# Patient Record
Sex: FEMALE | URBAN - METROPOLITAN AREA
[De-identification: names, ages, dates, MRNs, and addresses within clinical notes are randomized per-mention and may not be internally consistent; named-entity substitution may affect disease eponyms.]

---

## 2017-04-13 NOTE — PROGRESS NOTES
Followup Gyn Onc Note    RE: Jojo Fuentes  : 1955  EVIN: 2016  Managing Heme Oncologist:   Dr Licea at 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 16: XL//BSO/OMX/suboptimal tumor debulking by Dr Lopez (operative notes conflicting regarding extent of residual disease).   2.  Received 6 cycles of adjuvant dose dense chemotherapy in [facility] with Dr Licea (dose dense x 3 cycles, then q 3 week for cycles 4-6)  Per review of the notes, has had some issues with pancytopenia, but no major treatment delays    Ms Jojo Fuentes is a 61 year old year old [place] female who presents for followup egarding advanced ovarian cancer. She is here today with her daughter and the [facility] .   She has completed 6 cycles of adjuvant dose dense carbo/taxol.     Today, she feels very well. Still with some neuropathy of feet. Getting strength back. No bleeding.       Review of Systems:  Systemic:No weight changes.    Skin : No skin changes or new lesions.   Eye : No changes in vision.   Pulmonary: No cough or SOB.   Cardiovascular: No CP or palpitations  Gastrointestinal : No diarrhea, constipation, abdominal pain. Bowel habits normal. Nausea  Genitourinary: No dysuria, urgency or bleeding  Psychiatric: No depression or anxiety  Hematologic : No palpable lymph nodes.   Endocrine : No hot flashes. No heat/cold intolerance.      Neurological: No headaches, some numbness.       Past Medical History:  Past Medical History:   Diagnosis Date     Chronic pain      Ovarian cancer (H)      Sinusitis     h/o         Past Surgical History:  Past Surgical History:   Procedure Laterality Date     HYSTERECTOMY TOTAL ABDOMINAL, BILATERAL SALPINGO-OOPHORECTOMY, NODE DISSECTION, COMBINED Bilateral 2016    Procedure: COMBINED HYSTERECTOMY TOTAL ABDOMINAL, SALPINGO-OOPHORECTOMY, NODE DISSECTION;  Surgeon: Willie  Geno PERRY MD;  Location:  OR           [facility] history and Health Maintenance:    Last Mamogram: normal  Last Colonoscopy: 2016 normal     Current Medications:   Current Outpatient Prescriptions   Medication Sig Dispense Refill     oxyCODONE (ROXICODONE) 5 MG immediate release tablet Take 1-2 tablets (5-10 mg) by mouth every 4 hours as needed for moderate to severe pain 40 tablet 0     acetaminophen (TYLENOL) 325 MG tablet Take 2 tablets (650 mg) by mouth every 4 hours as needed for mild pain or fever 100 tablet 0     ibuprofen (ADVIL,MOTRIN) 600 MG tablet Take 1 tablet (600 mg) by mouth every 6 hours as needed for other (inflammatory pain) 100 tablet 0     enoxaparin (LOVENOX) 40 MG/0.4ML syringe Inject 0.4 mLs (40 mg) Subcutaneous every 24 hours 25 Syringe 0     polyethylene glycol (MIRALAX/GLYCOLAX) packet Take 17 g by mouth daily 30 packet 0     senna-docusate (SENOKOT-S;PERICOLACE) 8.6-50 MG per tablet Take 1-2 tablets by mouth 2 times daily 100 tablet 0     simethicone (MYLICON) 80 MG chewable tablet Take 1 tablet (80 mg) by mouth 4 times daily as needed for cramping (gas) 180 tablet 0     Omeprazole (PRILOSEC PO) Take 40 mg by mouth every morning       GABAPENTIN PO Take 100 mg by mouth daily Take 2 tabs at bedtime       VITAMIN D, CHOLECALCIFEROL, PO Take 1,000 Units by mouth daily Take 1 tab 2 x daily           Allergies:      Allergies   Allergen Reactions     Ascorbate Swelling     heartburn     No Clinical Screening - See Comments Itching     Pt reports she gets an allergic reaction to all pills - her eyes swell and she gets itchy without a rash        Social History:  Social History   Substance Use Topics     Smoking status: Never Smoker     Smokeless tobacco: Never Used     Alcohol use No   Work: homemaker  Ethnicity identification: French  Preferred language: French  Lives at home with: Daughters    Family History:   The patient's family history is notable for no known gyn, colon, breast  "malignanices although limited knowledge of disease in previous generations        Physical Exam:     /72  Pulse 68  Temp 98.2  F (36.8  C) (Oral)  Resp 18  Ht 1.651 m (5' 5\")  Wt 93 kg (205 lb)  SpO2 99%  BMI 34.11 kg/m2  Body mass index is 34.11 kg/(m^2).    General: Alert and oriented, no acute distress. Bright affect  Psych: Mood stable  Cardiovascular: RRR, no murmors  Pulmonary: Lungs clear . Normal respiratory effort  GI: Obesity. No distention. No masses. No hernia. Incision is well healed  Lymph: No enlarge lymph nodes in neck or groin  : Evidence of previous genital surgery with absent labia minora and minimal clitoris. Cervix multiparous and present. Cervix smooth except.  Nabothain cysts on cervix.   Chest:IV port c/d/i.     Ca125:   6/2016: 143  8/12/16: 25.3  9/16: 14  11/11/2016: 10  2/27/2017 (in [facility]): 12        CT C/A/P 11/3/2016: Impression: Thickened endometrium, presumably from the stated endometrial carcinoma. No convincng metastatic disease. Mildly complex splenic cyst.     Assessment:    Jojo Fuentes is a 61 year old woman with Advanced ovarian/fallopian adenocarcinoma, stage IIIC suboptimally debulked, completed adjuvant carbo/taxol. I have reviewed outside records including CT scan, labs, reports.       Plan:     1.)   Ovarian/fallopian tube cancer: JOAN based on Ca-125 and CT scan. As previously discussed, I do not think a \"second look\" surgery is indicated in her. I would recommend ongoing surveillance with Ca-125 and exams every 3 months. No CT scans.     -Followup q 3 months. Discussed symptoms of recurrence.   -Will need port flushes in [facility] q 6 weeks between our visits     2.) Genetic risk factors were assessed: Met with counselor today. Financial concerns which she is working out. Need to followup      3.) Labs and/or tests ordered include: Ca-125       Will send note to Dr Licea in [facility]    A total of 25 minutes of which were spent in counseling the " patient and/or treatment planning.    Ellen Pitts MD    Department of Ob/Gyn and Women's Health  Division of Gynecologic Oncology  [facility]  [phone]

## 2017-04-14 ENCOUNTER — OFFICE VISIT (OUTPATIENT)
Dept: ONCOLOGY | Facility: CLINIC | Age: 62
End: 2017-04-14
Attending: GENETIC COUNSELOR, MS
Payer: COMMERCIAL

## 2017-04-14 ENCOUNTER — ONCOLOGY VISIT (OUTPATIENT)
Dept: ONCOLOGY | Facility: CLINIC | Age: 62
End: 2017-04-14
Attending: OBSTETRICS & GYNECOLOGY
Payer: COMMERCIAL

## 2017-04-14 ENCOUNTER — APPOINTMENT (OUTPATIENT)
Dept: LAB | Facility: CLINIC | Age: 62
End: 2017-04-14
Attending: OBSTETRICS & GYNECOLOGY
Payer: COMMERCIAL

## 2017-04-14 VITALS
HEART RATE: 68 BPM | DIASTOLIC BLOOD PRESSURE: 72 MMHG | OXYGEN SATURATION: 99 % | TEMPERATURE: 98.2 F | SYSTOLIC BLOOD PRESSURE: 110 MMHG | HEIGHT: 65 IN | BODY MASS INDEX: 34.16 KG/M2 | WEIGHT: 205 LBS | RESPIRATION RATE: 18 BRPM

## 2017-04-14 DIAGNOSIS — C57.00 FALLOPIAN TUBE CANCER, CARCINOMA, UNSPECIFIED LATERALITY (H): Primary | ICD-10-CM

## 2017-04-14 DIAGNOSIS — N83.8 OVARIAN MASS: ICD-10-CM

## 2017-04-14 DIAGNOSIS — C57.00 FALLOPIAN TUBE CANCER, CARCINOMA, UNSPECIFIED LATERALITY (H): ICD-10-CM

## 2017-04-14 DIAGNOSIS — Z98.890 S/P LAPAROTOMY: ICD-10-CM

## 2017-04-14 LAB — CANCER AG125 SERPL-ACNC: 8 U/ML (ref 0–30)

## 2017-04-14 PROCEDURE — 96040 ZZH GENETIC COUNSELING, EACH 30 MINUTES: CPT | Mod: ZF | Performed by: GENETIC COUNSELOR, MS

## 2017-04-14 PROCEDURE — 99214 OFFICE O/P EST MOD 30 MIN: CPT | Mod: ZP | Performed by: OBSTETRICS & GYNECOLOGY

## 2017-04-14 RX ORDER — HEPARIN SODIUM (PORCINE) LOCK FLUSH IV SOLN 100 UNIT/ML 100 UNIT/ML
5 SOLUTION INTRAVENOUS DAILY PRN
Status: DISCONTINUED | OUTPATIENT
Start: 2017-04-14 | End: 2017-04-14 | Stop reason: HOSPADM

## 2017-04-14 RX ORDER — POLYETHYLENE GLYCOL 3350 17 G/17G
17 POWDER, FOR SOLUTION ORAL DAILY
Qty: 30 PACKET | Refills: 0 | Status: SHIPPED | OUTPATIENT
Start: 2017-04-14 | End: 2020-06-29

## 2017-04-14 RX ORDER — AMOXICILLIN 250 MG
1-2 CAPSULE ORAL 2 TIMES DAILY
Qty: 100 TABLET | Refills: 0 | Status: SHIPPED | OUTPATIENT
Start: 2017-04-14 | End: 2020-06-29

## 2017-04-14 RX ORDER — ACETAMINOPHEN 325 MG/1
650 TABLET ORAL EVERY 4 HOURS PRN
Qty: 100 TABLET | Refills: 0 | Status: SHIPPED | OUTPATIENT
Start: 2017-04-14

## 2017-04-14 RX ORDER — IBUPROFEN 600 MG/1
600 TABLET, FILM COATED ORAL EVERY 6 HOURS PRN
Qty: 100 TABLET | Refills: 0 | Status: SHIPPED | OUTPATIENT
Start: 2017-04-14

## 2017-04-14 RX ADMIN — SODIUM CHLORIDE, PRESERVATIVE FREE 5 ML: 5 INJECTION INTRAVENOUS at 12:37

## 2017-04-14 ASSESSMENT — PAIN SCALES - GENERAL: PAINLEVEL: NO PAIN (0)

## 2017-04-14 NOTE — LETTER
2017       RE: Jojo Fuentes  [address]    [place] [address]     Dear Colleague,    Thank you for referring your patient, Jojo Fuentes, to the [facility]. Please see a copy of my visit note below.    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 16: XL//BSO/OMX/suboptimal tumor debulking by Dr Lopez (operative notes conflicting regarding extent of residual disease).   2.  Received 6 cycles of adjuvant dose dense chemotherapy in [facility] with Dr Licea (dose dense x 3 cycles, then q 3 week for cycles 4-6)  Per review of the notes, has had some issues with pancytopenia, but no major treatment delays    Ms Jojo Fuentes is a 61 year old year old [place] female who presents for followup egarding advanced ovarian cancer. She is here today with her daughter and the [facility] .   She has completed 6 cycles of adjuvant dose dense carbo/taxol.     Today, she feels very well. Still with some neuropathy of feet. Getting strength back. No bleeding.       Review of Systems:  Systemic:No weight changes.    Skin : No skin changes or new lesions.   Eye : No changes in vision.   Pulmonary: No cough or SOB.   Cardiovascular: No CP or palpitations  Gastrointestinal : No diarrhea, constipation, abdominal pain. Bowel habits normal. Nausea  Genitourinary: No dysuria, urgency or bleeding  Psychiatric: No depression or anxiety  Hematologic : No palpable lymph nodes.   Endocrine : No hot flashes. No heat/cold intolerance.      Neurological: No headaches, some numbness.       Past Medical History:  Past Medical History:   Diagnosis Date     Chronic pain      Ovarian cancer (H)      Sinusitis     h/o         Past Surgical History:  Past Surgical  History:   Procedure Laterality Date     HYSTERECTOMY TOTAL ABDOMINAL, BILATERAL SALPINGO-OOPHORECTOMY, NODE DISSECTION, COMBINED Bilateral 2016    Procedure: COMBINED HYSTERECTOMY TOTAL ABDOMINAL, SALPINGO-OOPHORECTOMY, NODE DISSECTION;  Surgeon: Geno Tapia MD;  Location:  OR           [facility] history and Health Maintenance:    Last Mamogram: normal  Last Colonoscopy: 2016 normal     Current Medications:   Current Outpatient Prescriptions   Medication Sig Dispense Refill     oxyCODONE (ROXICODONE) 5 MG immediate release tablet Take 1-2 tablets (5-10 mg) by mouth every 4 hours as needed for moderate to severe pain 40 tablet 0     acetaminophen (TYLENOL) 325 MG tablet Take 2 tablets (650 mg) by mouth every 4 hours as needed for mild pain or fever 100 tablet 0     ibuprofen (ADVIL,MOTRIN) 600 MG tablet Take 1 tablet (600 mg) by mouth every 6 hours as needed for other (inflammatory pain) 100 tablet 0     enoxaparin (LOVENOX) 40 MG/0.4ML syringe Inject 0.4 mLs (40 mg) Subcutaneous every 24 hours 25 Syringe 0     polyethylene glycol (MIRALAX/GLYCOLAX) packet Take 17 g by mouth daily 30 packet 0     senna-docusate (SENOKOT-S;PERICOLACE) 8.6-50 MG per tablet Take 1-2 tablets by mouth 2 times daily 100 tablet 0     simethicone (MYLICON) 80 MG chewable tablet Take 1 tablet (80 mg) by mouth 4 times daily as needed for cramping (gas) 180 tablet 0     Omeprazole (PRILOSEC PO) Take 40 mg by mouth every morning       GABAPENTIN PO Take 100 mg by mouth daily Take 2 tabs at bedtime       VITAMIN D, CHOLECALCIFEROL, PO Take 1,000 Units by mouth daily Take 1 tab 2 x daily           Allergies:      Allergies   Allergen Reactions     Ascorbate Swelling     heartburn     No Clinical Screening - See Comments Itching     Pt reports she gets an allergic reaction to all pills - her eyes swell and she gets itchy without a rash        Social History:  Social History   Substance Use Topics     Smoking status: Never Smoker      "Smokeless tobacco: Never Used     Alcohol use No   Work: homemaker  Ethnicity identification: Citizen of [place]  Preferred language: Citizen of [place]  Lives at home with: Daughters    Family History:   The patient's family history is notable for no known gyn, colon, breast malignanices although limited knowledge of disease in previous generations        Physical Exam:     /72  Pulse 68  Temp 98.2  F (36.8  C) (Oral)  Resp 18  Ht 1.651 m (5' 5\")  Wt 93 kg (205 lb)  SpO2 99%  BMI 34.11 kg/m2  Body mass index is 34.11 kg/(m^2).    General: Alert and oriented, no acute distress. Bright affect  Psych: Mood stable  Cardiovascular: RRR, no murmors  Pulmonary: Lungs clear . Normal respiratory effort  GI: Obesity. No distention. No masses. No hernia. Incision is well healed  Lymph: No enlarge lymph nodes in neck or groin  : Evidence of previous genital surgery with absent labia minora and minimal clitoris. Cervix multiparous and present. Cervix smooth except.  Nabothain cysts on cervix.   Chest:IV port c/d/i.     Ca125:   6/2016: 143  8/12/16: 25.3  9/16: 14  11/11/2016: 10  2/27/2017 (in [facility]): 12        CT C/A/P 11/3/2016: Impression: Thickened endometrium, presumably from the stated endometrial carcinoma. No convincng metastatic disease. Mildly complex splenic cyst.     Assessment:    Jojo Fuentes is a 61 year old woman with Advanced ovarian/fallopian adenocarcinoma, stage IIIC suboptimally debulked, completed adjuvant carbo/taxol. I have reviewed outside records including CT scan, labs, reports.       Plan:     1.)   Ovarian/fallopian tube cancer: JOAN based on Ca-125 and CT scan. As previously discussed, I do not think a \"second look\" surgery is indicated in her. I would recommend ongoing surveillance with Ca-125 and exams every 3 months. No CT scans.     -Followup q 3 months. Discussed symptoms of recurrence.   -Will need port flushes in [facility] q 6 weeks between our visits     2.) Genetic risk factors were assessed: " Met with counselor today. Financial concerns which she is working out. Need to followup      3.) Labs and/or tests ordered include: Ca-125       Will send note to Dr Licea in [facility]    A total of 25 minutes of which were spent in counseling the patient and/or treatment planning.    Ellen Pitts MD    Department of Ob/Gyn and Women's Health  Division of Gynecologic Oncology  [facility]  [phone]

## 2017-04-14 NOTE — LETTER
4/14/2017       RE: Jojo Fuentes  [address]    [place] [address]     Dear Colleague,    Thank you for referring your patient, Jojo Fuentes, to the [facility]. Please see a copy of my visit note below.    4/14/2017     Referring Provider: Ellen Pitts MD     Presenting Information:   I met with Jojo Fuentes and her daughter today for genetic counseling at the Cancer [facility] Management Program at the [facility] to discuss her diagnosis of fallopian tube cancer. She is here again today to review genetic testing. We met previously on 11/11/2016, but due to time constraints, we were unable to complete our visit.  A [employer]  was used for the visit today.     Personal History:  Jojo is a 62 year old female.  She was diagnosed with fallopian tube cancer at 61, and was treated with a KATALINA/BSO and chemotherapy. She denies personal history of other cancers. She had her first menstrual period at age 17, her first child at age 27, and is post-menopausal.    She reports no history of oral contraceptive use.  She reports having had two mammograms in the past that were normal. She also reports a normal colonoscopy at the time of her fallopian tube cancer diagnosis.      Family History: obtained at initial visit on 11/11/2016, and scanned into Media tab.  In short, Jojo has no known family history of cancer.      Discussion:    Jojo's personal history of fallopian tube cancer is suggestive of a hereditary cause. Family history information, however, is limited.  We discussed that we do not know for certain if relatives in her family had cancer that she is not aware of, though she feels that no relatives have had cancer.    We reviewed the natural history and genetics of fallopian tube cancer. We discussed that some cancers can be due to a mistake, or a mutation, in a gene. We discussed that if a mutation is found in a person, they will be at a much higher  risk for certain cancers than the general population. These mistakes can be passed from generation to generation in a family.    We discussed that mutations in either BRCA1 or BRCA2 cause increased risk for ovarian and breast cancer. We also discussed Emmanuel syndrome.  We discussed that if someone has Emmanuel syndrome, they are at in increased risk for colon cancer, endometrial/uterine cancer, and ovarian cancer. There can be other cancers seen with Emmanuel syndrome, as well. We also discussed that there are other genes that can increase a woman's risk for fallopian tube or ovarian cancer.     Genetic testing can help determine whether or not a cancer susceptibility mutation is present in a family, and as a result may provide information that could help clarify an individual's (and possibly other family members') cancer risks.  It may also help direct decisions about cancer screening and prevention options.  We reviewed that when someone has a mutation in the BRCA1 or BRCA2 genes, additional screening can be done to screen for breast cancer. Some women elect a preventative surgery to remove the breasts to reduce cancer risk.  We also discussed that with Emmanuel syndrome, more frequent colonoscopies and other types of cancer screens can be recommended.     Based on her personal history, Jojo meets current National Comprehensive Cancer Network (NCCN) criteria for genetic testing of BRCA1 and BRCA2.      We reviewed genetic testing options for hereditary gynecologic cancers: actionable high/moderate risk panel testing (GYNplus, 13 genes), and expanded high and moderate risk panel testing (OvaNext, 24 genes). We discussed that the actionable genes could guide Jojo's screening for other types of cancer.   Some of the genes in the GYNplus panel are associated with specific hereditary cancer syndromes and have published management guidelines: Hereditary Breast and Ovarian Cancer syndrome (BRCA1, BRCA2), Emmanuel syndrome (EPCAM,  MLH1, MSH2, MSH6, PMS2), Cowden Syndrome (PTEN), and Li Fraumeni syndrome (TP53).   Risk-reducing salpingo-oophorectomy can be considered in women with mutations in BRIP1, RAD51C, or RAD51D.  Breast screening recommendations are available for the PALB2 gene.    Risks, benefits, and limitations of genetic testing were discussed. Implications of possible test results, including positive, negative, and variant of uncertain significance, were discussed today.  At the end of our visit, Jojo was most interested in the genes that could guide screening for other cancers, and elected the GynPlus panel. We reviewed the consent form, and consent was obtained for testing. She wished to proceed with prior-authorization for testing first, as she would like to know the cost of testing.    Medical Management: The information from genetic testing may determine additional cancer screening recommendations (i.e. mammogram and breast MRI, more frequent colonoscopies, annual dermatologic exams, etc.) as well as options for risk reducing surgeries (i.e. bilateral mastectomy) for Jojo and possibly her relatives.  These recommendations will be discussed in detail once genetic testing is completed.     Plan:  1) Today Jojo elected to proceed with pre-authorization for GynPlus.   2) This information should be available in a few weeks.  3) Should Jojo choose to proceed with testing after pre-authorization is obtained, we will try to coordinate our future visit to coincide with when she sees Dr. Pitts next.      Face to face time: 40 mins     Lisa Fournier MS, [facility]  Certified Genetic Counselor  P. [phone]  F. [phone]

## 2017-04-14 NOTE — LETTER
Cancer Risk Management  Program Locations    [facility]  [facility]  Mailing Address  Cancer Risk Management Program  [facility]  [address]  [address]  [place] [address]    New patient appointments  [phone]  April 18, 2017    Jojo Fuentes  [address]    [place] [address]      Dear Jojo,    It was a pleasure meeting with you at the [facility] on 4/14/2017.  Here is a copy of the progress note from your recent genetic counseling visit to the Cancer Risk Management Program.  If you have any additional questions, please feel free to call.    Referring Provider: Ellen Pitts MD     Presenting Information:   I met with Jojo Fuentes and her daughter today for genetic counseling at the Cancer Risk Management Program at the [facility] to discuss her diagnosis of fallopian tube cancer. She is here again today to review genetic testing. We met previously on 11/11/2016, but due to time constraints, we were unable to complete our visit.  A [employer]  was used for the visit today.     Personal History:  Jojo is a 62 year old female.  She was diagnosed with fallopian tube cancer at 61, and was treated with a KATALINA/BSO and chemotherapy. She denies personal history of other cancers. She had her first menstrual period at age 17, her first child at age 27, and is post-menopausal.    She reports no history of oral contraceptive use.  She reports having had two mammograms in the past that were normal. She also reports a normal colonoscopy at the time of her fallopian tube cancer diagnosis.      Family History: obtained at initial visit on 11/11/2016, and scanned into Media tab.  In short, Jojo has no known family history of cancer.      Discussion:    Jojo's personal history  of fallopian tube cancer is suggestive of a hereditary cause. Family history information, however, is limited.  We discussed that we do not know for certain if relatives in her family had cancer that she is not aware of, though she feels that no relatives have had cancer.    We reviewed the natural history and genetics of fallopian tube cancer. We discussed that some cancers can be due to a mistake, or a mutation, in a gene. We discussed that if a mutation is found in a person, they will be at a much higher risk for certain cancers than the general population. These mistakes can be passed from generation to generation in a family.    We discussed that mutations in either BRCA1 or BRCA2 cause increased risk for ovarian and breast cancer. We also discussed Emmanuel syndrome.  We discussed that if someone has Emmanuel syndrome, they are at in increased risk for colon cancer, endometrial/uterine cancer, and ovarian cancer. There can be other cancers seen with Emmanuel syndrome, as well. We also discussed that there are other genes that can increase a woman's risk for fallopian tube or ovarian cancer.     Genetic testing can help determine whether or not a cancer susceptibility mutation is present in a family, and as a result may provide information that could help clarify an individual's (and possibly other family members') cancer risks.  It may also help direct decisions about cancer screening and prevention options.  We reviewed that when someone has a mutation in the BRCA1 or BRCA2 genes, additional screening can be done to screen for breast cancer. Some women elect a preventative surgery to remove the breasts to reduce cancer risk.  We also discussed that with Emmanuel syndrome, more frequent colonoscopies and other types of cancer screens can be recommended.     Based on her personal history, Jojo meets current National Comprehensive Cancer Network (NCCN) criteria for genetic testing of BRCA1 and BRCA2.      We reviewed  genetic testing options for hereditary gynecologic cancers: actionable high/moderate risk panel testing (GYNplus, 13 genes), and expanded high and moderate risk panel testing (OvaNext, 24 genes). We discussed that the actionable genes could guide Jojo's screening for other types of cancer.   Some of the genes in the GYNplus panel are associated with specific hereditary cancer syndromes and have published management guidelines: Hereditary Breast and Ovarian Cancer syndrome (BRCA1, BRCA2), Emmanuel syndrome (EPCAM, MLH1, MSH2, MSH6, PMS2), Cowden Syndrome (PTEN), and Li Fraumeni syndrome (TP53).   Risk-reducing salpingo-oophorectomy can be considered in women with mutations in BRIP1, RAD51C, or RAD51D.  Breast screening recommendations are available for the PALB2 gene.    Risks, benefits, and limitations of genetic testing were discussed. Implications of possible test results, including positive, negative, and variant of uncertain significance, were discussed today.  At the end of our visit, Jojo was most interested in the genes that could guide screening for other cancers, and elected the GynPlus panel. We reviewed the consent form, and consent was obtained for testing. She wished to proceed with prior-authorization for testing first, as she would like to know the cost of testing.    Medical Management: The information from genetic testing may determine additional cancer screening recommendations (i.e. mammogram and breast MRI, more frequent colonoscopies, annual dermatologic exams, etc.) as well as options for risk reducing surgeries (i.e. bilateral mastectomy) for Jojo and possibly her relatives.  These recommendations will be discussed in detail once genetic testing is completed.     Plan:  1) Today Jojo elected to proceed with pre-authorization for GynPlus.   2) This information should be available in a few weeks.  3) Should Jojo choose to proceed with testing after pre-authorization is obtained, we will  try to coordinate our future visit to coincide with when she sees Dr. Pitts next.    Face to face time: 40 mins     Lisa Fournier MS, [facility]  Certified Genetic Counselor  P. [phone], F. [phone]

## 2017-04-14 NOTE — NURSING NOTE
"Jojo Fuentes is a 62 year old female who presents for:  Chief Complaint   Patient presents with     Port Draw     port accessed by RN, lab collected and sent and pt de-accessed immediately. Vitals taken and pt checked in for next appt.     Oncology Clinic Visit     Ovarian Mass F/U        Initial Vitals:  /72  Pulse 68  Temp 98.2  F (36.8  C) (Oral)  Resp 18  Ht 1.651 m (5' 5\")  Wt 93 kg (205 lb)  SpO2 99%  BMI 34.11 kg/m2 Estimated body mass index is 34.11 kg/(m^2) as calculated from the following:    Height as of this encounter: 1.651 m (5' 5\").    Weight as of this encounter: 93 kg (205 lb).. Body surface area is 2.07 meters squared. BP completed using cuff size: NA (Not Taken)  No Pain (0) No LMP recorded. Patient is postmenopausal. Allergies and medications reviewed.     Medications: MEDICATION REFILLS NEEDED TODAY.  Pharmacy name entered into EPIC: Data Unavailable    Comments: Vitals completed in lab.    7 minutes for nursing intake (face to face time)   Gabbie Reyez LPN        "

## 2017-04-14 NOTE — PROGRESS NOTES
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 She is here again today to review genetic testing. We met previously on 11/11/2016, but due to time constraints, we were unable to complete our visit.  A [employer]  was used for the visit today.     Personal History:  Jojo is a 62 year old female.  She was diagnosed with fallopian tube cancer at 61, and was treated with a KATALINA/BSO and chemotherapy. She denies personal history of other cancers. She had her first menstrual period at age 17, her first child at age 27, and is post-menopausal.    She reports no history of oral contraceptive use.  She reports having had two mammograms in the past that were normal. She also reports a normal colonoscopy at the time of her fallopian tube cancer diagnosis.      Family History: obtained at initial visit on 11/11/2016, and scanned into Media tab.  In short, Jojo has no known family history of cancer.      Discussion:    Jojo's personal history of fallopian tube cancer is suggestive of a hereditary cause. Family history information, however, is limited.  We discussed that we do not know for certain if relatives in her family had cancer that she is not aware of, though she feels that no relatives have had cancer.    We reviewed the natural history and genetics of fallopian tube cancer. We discussed that some cancers can be due to a mistake, or a mutation, in a gene. We discussed that if a mutation is found in a person, they will be at a much higher risk for certain cancers than the general population. These mistakes can be passed from generation to generation in a family.    We discussed that mutations in either BRCA1 or BRCA2 cause increased risk for ovarian and breast cancer. We also discussed  Emmanuel syndrome.  We discussed that if someone has Emmanuel syndrome, they are at in increased risk for colon cancer, endometrial/uterine cancer, and ovarian cancer. There can be other cancers seen with Emmanuel syndrome, as well. We also discussed that there are other genes that can increase a woman's risk for fallopian tube or ovarian cancer.     Genetic testing can help determine whether or not a cancer susceptibility mutation is present in a family, and as a result may provide information that could help clarify an individual's (and possibly other family members') cancer risks.  It may also help direct decisions about cancer screening and prevention options.  We reviewed that when someone has a mutation in the BRCA1 or BRCA2 genes, additional screening can be done to screen for breast cancer. Some women elect a preventative surgery to remove the breasts to reduce cancer risk.  We also discussed that with Emmanuel syndrome, more frequent colonoscopies and other types of cancer screens can be recommended.     Based on her personal history, Jojo meets current National Comprehensive Cancer Network (NCCN) criteria for genetic testing of BRCA1 and BRCA2.      We reviewed genetic testing options for hereditary gynecologic cancers: actionable high/moderate risk panel testing (GYNplus, 13 genes), and expanded high and moderate risk panel testing (OvaNext, 24 genes). We discussed that the actionable genes could guide Jojo's screening for other types of cancer.   Some of the genes in the GYNplus panel are associated with specific hereditary cancer syndromes and have published management guidelines: Hereditary Breast and Ovarian Cancer syndrome (BRCA1, BRCA2), Emmanuel syndrome (EPCAM, MLH1, MSH2, MSH6, PMS2), Cowden Syndrome (PTEN), and Li Fraumeni syndrome (TP53).   Risk-reducing salpingo-oophorectomy can be considered in women with mutations in BRIP1, RAD51C, or RAD51D.  Breast screening recommendations are available for the  PALB2 gene.    Risks, benefits, and limitations of genetic testing were discussed. Implications of possible test results, including positive, negative, and variant of uncertain significance, were discussed today.  At the end of our visit, Jojo was most interested in the genes that could guide screening for other cancers, and elected the GynPlus panel. We reviewed the consent form, and consent was obtained for testing. She wished to proceed with prior-authorization for testing first, as she would like to know the cost of testing.    Medical Management: The information from genetic testing may determine additional cancer screening recommendations (i.e. mammogram and breast MRI, more frequent colonoscopies, annual dermatologic exams, etc.) as well as options for risk reducing surgeries (i.e. bilateral mastectomy) for Jojo and possibly her relatives.  These recommendations will be discussed in detail once genetic testing is completed.     Plan:  1) Today Jojo elected to proceed with pre-authorization for GynPlus.   2) This information should be available in a few weeks.  3) Should Jojo choose to proceed with testing after pre-authorization is obtained, we will try to coordinate our future visit to coincide with when she sees Dr. Pitts next.      Face to face time: 40 mins     Lisa Fournier MS, [facility]  Certified Genetic Counselor  P. [phone]  F. [phone]

## 2017-04-14 NOTE — MR AVS SNAPSHOT
After Visit Summary   4/14/2017    Jojo Fuentes    MRN: [id number]           Patient Information     Date Of Birth          1955        Visit Information        Provider Department      4/14/2017 11:00 AM Lisa Fournier GC; [employer];  2 [address] CONSULT [facility]        Today's Diagnoses     Fallopian tube cancer, carcinoma, unspecified laterality (H)    -  1       Follow-ups after your visit        Your next 10 appointments already scheduled     Jul 14, 2017 12:00 PM CDT   [facility]   [facility] Lab Draw ([facility])    [address] [place] [address] [id number] [phone]            Jul 14, 2017 12:30 PM CDT   (Arrive by 12:15 PM)   Return Visit with Ellen Pitts MD   [facility] ([facility])    [address] [place] [address] [id number] [phone]              Who to contact     If you have questions or need follow up information about today's clinic visit or your schedule please contact [facility] directly at [phone].  Normal or non-critical lab and imaging results will be communicated to you by MyChart, letter or phone within 4 business days after the clinic has received the results. If you do not hear from us within 7 days, please contact the clinic through MyChart or phone. If you have a critical or abnormal lab result, we will notify you by phone as soon as possible.  Submit refill requests through [employer] or call your pharmacy and they will forward the refill request to us. Please allow 3 business days for your refill to be completed.          Additional Information About Your Visit        [employer][email or web address])hart Information     [employer] lets you send messages to your doctor, view your test results, renew your prescriptions, schedule appointments and more. To sign up, go to  [email or web address] . Click on \"Log in\" on the left side of the screen, which will take you to the Welcome page. Then click on \"Sign up Now\" on the right side of the page.     You will be asked to enter the access code listed below, as well as some personal information. Please follow the directions to create your username and password.     Your access code is: [email or web address]  Expires: 2017  9:18 AM     Your access code will  in 90 days. If you need help or a new code, please call your [facility] clinic or [phone].        Care EveryWhere ID     This is your Care EveryWhere ID. This could be used by other organizations to access your [facility] medical records  [id number]         Blood Pressure from Last 3 Encounters:   17 110/72   16 127/82   16 132/80    Weight from Last 3 Encounters:   17 93 kg (205 lb)   16 87.8 kg (193 lb 8 oz)   16 86.7 kg (191 lb 3.2 oz)              Today, you had the following     No orders found for display         Today's Medication Changes          These changes are accurate as of: 17 11:59 PM.  If you have any questions, ask your nurse or doctor.               Start taking these medicines.        Dose/Directions    calcium-vitamin D 600-400 MG-UNIT per tablet   Commonly known as:  CALTRATE   Used for:  Fallopian tube cancer, carcinoma, unspecified laterality (H)   Started by:  Ellen Pitts MD        Dose:  1 tablet   Take 1 tablet by mouth 2 times daily   Quantity:  60 tablet   Refills:  3            Where to get your medicines      These medications were sent to [id number] - [place], [place] - [address] AT [address] [place] [address] & [address], [place] [address]    Hours:  Test fax successful 02   Phone:  [phone]     acetaminophen 325 MG tablet    calcium-vitamin D 600-400 MG-UNIT per tablet    ibuprofen 600 MG tablet    polyethylene glycol Packet    senna-docusate 8.6-50 " MG per tablet                Primary Care Provider Office Phone # Fax #    Aimee Huang [phone]       [facility] [address]  [facility] [address]        Thank you!     Thank you for choosing [facility]  for your care. Our goal is always to provide you with excellent care. Hearing back from our patients is one way we can continue to improve our services. Please take a few minutes to complete the written survey that you may receive in the mail after your visit with us. Thank you!             Your Updated Medication List - Protect others around you: Learn how to safely use, store and throw away your medicines at www.disposemymeds.org.          This list is accurate as of: 4/14/17 11:59 PM.  Always use your most recent med list.                   Brand Name Dispense Instructions for use    acetaminophen 325 MG tablet    TYLENOL    100 tablet    Take 2 tablets (650 mg) by mouth every 4 hours as needed for mild pain or fever       calcium-vitamin D 600-400 MG-UNIT per tablet    CALTRATE    60 tablet    Take 1 tablet by mouth 2 times daily       enoxaparin 40 MG/0.4ML injection    LOVENOX    25 Syringe    Inject 0.4 mLs (40 mg) Subcutaneous every 24 hours       GABAPENTIN PO      Take 100 mg by mouth daily Reported on 4/14/2017       ibuprofen 600 MG tablet    ADVIL/MOTRIN    100 tablet    Take 1 tablet (600 mg) by mouth every 6 hours as needed for other (inflammatory pain)       oxyCODONE 5 MG IR tablet    ROXICODONE    40 tablet    Take 1-2 tablets (5-10 mg) by mouth every 4 hours as needed for moderate to severe pain       polyethylene glycol Packet    MIRALAX/GLYCOLAX    30 packet    Take 17 g by mouth daily       PRILOSEC PO      Take 40 mg by mouth every morning Reported on 4/14/2017       senna-docusate 8.6-50 MG per tablet    SENOKOT-S;PERICOLACE    100 tablet    Take 1-2 tablets by mouth 2 times daily       simethicone 80 MG chewable tablet    MYLICON     180 tablet    Take 1 tablet (80 mg) by mouth 4 times daily as needed for cramping (gas)       VITAMIN D (CHOLECALCIFEROL) PO      Take 1,000 Units by mouth daily Reported on 4/14/2017

## 2017-04-14 NOTE — MR AVS SNAPSHOT
After Visit Summary   4/14/2017    Jojo Fuentes    MRN: [id number]           Patient Information     Date Of Birth          1955        Visit Information        Provider Department      4/14/2017 12:45 PM Ellen Pitts MD; [employer] [facility]        Today's Diagnoses     Fallopian tube cancer, carcinoma, unspecified laterality (H)        Ovarian mass        S/P laparotomy           Follow-ups after your visit        Follow-up notes from your care team     Return in about 3 months (around 7/14/2017).      Your next 10 appointments already scheduled     Jul 14, 2017 10:15 AM CDT   (Arrive by 10:00 AM)   RETURN WITH ROOM with Lisa Fournier GC,  2 118 CONSULT RM   [facility] ([facility])    [address] [place] [address] [id number] [phone]            Jul 14, 2017 12:00 PM CDT   [facility]   [facility] Draw ([facility])    [address] [place] [address] [id number] [phone]            Jul 14, 2017 12:30 PM CDT   (Arrive by 12:15 PM)   Return Visit with Ellen Pitts MD   [facility] ([facility])    [address] [place] [address] [id number] [phone]              Who to contact     If you have questions or need follow up information about today's clinic visit or your schedule please contact [facility] directly at [phone].  Normal or non-critical lab and imaging results will be communicated to you by MyChart, letter or phone within 4 business days after the clinic has received the results. If you do not hear from us within 7 days, please contact the clinic through MyChart or phone. If you have a critical or abnormal lab result, we will notify you by phone as soon as possible.  Submit refill requests through [employer]  "or call your pharmacy and they will forward the refill request to us. Please allow 3 business days for your refill to be completed.          Additional Information About Your Visit        MyChart Information     [employer] lets you send messages to your doctor, view your test results, renew your prescriptions, schedule appointments and more. To sign up, go to [email or web address] . Click on \"Log in\" on the left side of the screen, which will take you to the Welcome page. Then click on \"Sign up Now\" on the right side of the page.     You will be asked to enter the access code listed below, as well as some personal information. Please follow the directions to create your username and password.     Your access code is: [email or web address]  Expires: 2017  9:18 AM     Your access code will  in 90 days. If you need help or a new code, please call your [facility] clinic or [phone].        Care EveryWhere ID     This is your Care EveryWhere ID. This could be used by other organizations to access your [facility] medical records  [id number]        Your Vitals Were     Pulse Temperature Respirations Height Pulse Oximetry BMI (Body Mass Index)    68 98.2  F (36.8  C) (Oral) 18 1.651 m (5' 5\") 99% 34.11 kg/m2       Blood Pressure from Last 3 Encounters:   17 110/72   16 127/82   16 132/80    Weight from Last 3 Encounters:   17 93 kg (205 lb)   16 87.8 kg (193 lb 8 oz)   16 86.7 kg (191 lb 3.2 oz)              We Performed the Following               Today's Medication Changes          These changes are accurate as of: 17 11:59 PM.  If you have any questions, ask your nurse or doctor.               Start taking these medicines.        Dose/Directions    calcium-vitamin D 600-400 MG-UNIT per tablet   Commonly known as:  CALTRATE   Used for:  Fallopian tube cancer, carcinoma, unspecified laterality (H)   Started by:  Ellen Pitts MD        Dose:  1 tablet   Take 1 tablet " by mouth 2 times daily   Quantity:  60 tablet   Refills:  3            Where to get your medicines      [facility] [place], [place] - [address] AT [address] [place] [address] & [address]  [address] [facility], [place] [address]    Hours:  Test fax successful 9/6/02  KR Phone:  [phone]     acetaminophen 325 MG tablet    calcium-vitamin D 600-400 MG-UNIT per tablet    ibuprofen 600 MG tablet    polyethylene glycol Packet    senna-docusate 8.6-50 MG per tablet                Primary Care Provider Office Phone # Fax #    Aimee Huang [phone]       [address] [address]        Thank you!     Thank you for choosing [facility]  for your care. Our goal is always to provide you with excellent care. Hearing back from our patients is one way we can continue to improve our services. Please take a few minutes to complete the written survey that you may receive in the mail after your visit with us. Thank you!             Your Updated Medication List - Protect others around you: Learn how to safely use, store and throw away your medicines at www.disposemymeds.org.          This list is accurate as of: 4/14/17 11:59 PM.  Always use your most recent med list.                   Brand Name Dispense Instructions for use    acetaminophen 325 MG tablet    TYLENOL    100 tablet    Take 2 tablets (650 mg) by mouth every 4 hours as needed for mild pain or fever       calcium-vitamin D 600-400 MG-UNIT per tablet    CALTRATE    60 tablet    Take 1 tablet by mouth 2 times daily       enoxaparin 40 MG/0.4ML injection    LOVENOX    25 Syringe    Inject 0.4 mLs (40 mg) Subcutaneous every 24 hours       GABAPENTIN PO      Take 100 mg by mouth daily Reported on 4/14/2017       ibuprofen 600 MG tablet    ADVIL/MOTRIN    100 tablet    Take 1 tablet (600 mg) by mouth every 6 hours as needed for other (inflammatory  pain)       oxyCODONE 5 MG IR tablet    ROXICODONE    40 tablet    Take 1-2 tablets (5-10 mg) by mouth every 4 hours as needed for moderate to severe pain       polyethylene glycol Packet    MIRALAX/GLYCOLAX    30 packet    Take 17 g by mouth daily       PRILOSEC PO      Take 40 mg by mouth every morning Reported on 4/14/2017       senna-docusate 8.6-50 MG per tablet    SENOKOT-S;PERICOLACE    100 tablet    Take 1-2 tablets by mouth 2 times daily       simethicone 80 MG chewable tablet    MYLICON    180 tablet    Take 1 tablet (80 mg) by mouth 4 times daily as needed for cramping (gas)       VITAMIN D (CHOLECALCIFEROL) PO      Take 1,000 Units by mouth daily Reported on 4/14/2017

## 2017-05-18 ENCOUNTER — TELEPHONE (OUTPATIENT)
Dept: ONCOLOGY | Facility: CLINIC | Age: 62
End: 2017-05-18

## 2017-05-18 NOTE — TELEPHONE ENCOUNTER
5/18/2017    I called Jojo today using a [employer] phone  to let her know that insurance will cover her genetic testing. I will set up a time for her to meet with me when she returns to see Dr. Pitts.    Lisa Fournier MS, [facility] [place]  Certified Genetic Counselor  P. [phone]  F. [phone]

## 2017-07-14 ENCOUNTER — OFFICE VISIT (OUTPATIENT)
Dept: ONCOLOGY | Facility: CLINIC | Age: 62
End: 2017-07-14
Attending: GENETIC COUNSELOR, MS
Payer: COMMERCIAL

## 2017-07-14 DIAGNOSIS — C57.00 FALLOPIAN TUBE CANCER, CARCINOMA, UNSPECIFIED LATERALITY (H): Primary | ICD-10-CM

## 2017-07-14 PROCEDURE — 25000128 H RX IP 250 OP 636: Performed by: OBSTETRICS & GYNECOLOGY

## 2017-07-14 PROCEDURE — 96040 ZZH GENETIC COUNSELING, EACH 30 MINUTES: CPT | Mod: ZF | Performed by: GENETIC COUNSELOR, MS

## 2017-07-14 RX ORDER — HEPARIN SODIUM (PORCINE) LOCK FLUSH IV SOLN 100 UNIT/ML 100 UNIT/ML
5 SOLUTION INTRAVENOUS EVERY 8 HOURS
Status: DISCONTINUED | OUTPATIENT
Start: 2017-07-14 | End: 2017-07-22 | Stop reason: HOSPADM

## 2017-07-14 RX ADMIN — SODIUM CHLORIDE, PRESERVATIVE FREE 5 ML: 5 INJECTION INTRAVENOUS at 11:58

## 2017-07-14 NOTE — PATIENT INSTRUCTIONS
Assessing Cancer Risk  Only about 5-10% of cancers are thought to be due to an inherited cancer susceptibility gene.    These families often have:    Several people with the same or related types of cancer    Cancers diagnosed at a young age (before age 50)    Individuals with more than one primary cancer    Multiple generations of the family affected with cancer    Some people may be candidates for genetic testing of more than one gene.  For these families, genetic testing using a cancer panel may be offered.  These panels can test many genes at once known to increase the risk for gynecologic (and other) cancers:  BRCA1, BRCA2, BRIP1 MLH1, MSH2, MSH6, PMS2, EPCAM, PTEN, PALB2, RAD51C, RAD51D, and TP53. The purpose of this handout is to serve as a brief summary of the gynecologic cancer risk genes that have published clinical management guidelines for individuals who are found to carry a mutation.    ______________________________________________________________________________  Hereditary Breast and Ovarian Cancer Syndrome   (BRCA1 and BRCA2)  A single mutation in one of the copies of BRCA1 or BRCA2 increases the risk for breast and ovarian cancer, among others.  The risk for pancreatic cancer and melanoma may also be slightly increased in some families.  The chart below shows the chance that someone with a BRCA mutation would develop cancer in his or her lifetime1,2,3,4.        A person s ethnic background is also important to consider, as individuals of Ashkenazi [place]ahaoism ancestry have a higher chance of having a BRCA gene mutation.  There are three BRCA mutations that occur more frequently in this population.    Emmanuel Syndrome   (MLH1, MSH2, MSH6, PMS2, and EPCAM)  Currently five genes are known to cause Emmanuel Syndrome: MLH1, MSH2, MSH6, PMS2, and EPCAM.  A single mutation in one of the Emmanuel Syndrome genes increases the risk for colon, endometrial, ovarian, and stomach cancers.  Other cancers that occur  less commonly in Emmanuel Syndrome include urinary tract, skin, and brain cancers.  The chart below shows the chance that a person with Emmanuel syndrome would develop cancer in his or her lifetime7.      *Cancer risk varies depending on Emmanuel syndrome gene found    Cowden Syndrome   (PTEN)  Cowden syndrome is a hereditary condition that increases the risk for breast, thyroid, endometrial, and kidney cancer.  Cowden syndrome is caused by a mutation in the PTEN gene.  A single mutation in one of the copies of PTEN causes Cowden syndrome and increases cancer risk.  The chart below shows the chance that someone with a PTEN mutation would develop cancer in their lifetime5,6.  Other benign features seen in some individuals with Cowden syndrome include benign skin lesions (facial papules, keratoses, lipomas), learning disability, autism, thyroid nodules, colon polyps, and larger head size.      *One recent study found breast cancer risk to be increased to 85%  Li-Fraumeni Syndrome   (TP53)  Li-Fraumeni Syndrome (LFS) is a cancer predisposition syndrome caused by a mutation in the TP53 gene. A single mutation in one of the copies of TP53 increases the risk for multiple cancers. Individuals with LFS are at an increased risk for developing cancer at a young age. The general lifetime risk for development of cancer is 50% by age 30 and 90% by age 60.     Core Cancers: Sarcomas, Breast, Brain, Lung, Leukemias/Lymphomas, Adrenocortical carcinomas  Other Cancers: Gastrointestinal, Thyroid, Skin, Genitourinary    Additional Genes  PALB2  Mutations in PALB2 have been shown to increase the risk of breast cancer up to 33-58% in some families; where individuals fall within this risk range is dependent upon family history. PALB2 mutations have also been associated with increased risk for pancreatic cancer, although this risk has not been quantified yet.  Individuals who inherit two PALB2 mutations--one from their mother and one from their  father--have a condition called Fanconi Anemia.  This rare autosomal recessive condition is associated with short stature, developmental delay, bone marrow failure, and increased risk for childhood cancers.    BRIP1, RAD51C and RAD51D  Mutations in BRIP1, RAD51C, and RAD51D have been shown to increase the risk of ovarian cancer as well as female breast cancer.    ______________________________________________________________________________    Inheritance  All of the cancer syndromes reviewed above are inherited in an autosomal dominant pattern.  This means that if a parent has a mutation, each of his or her children will have a 50% chance of inheriting that same mutation.  Therefore, each child--male or female--would have a 50% chance of being at increased risk for developing cancer.      Image obtained from Genetics Home Reference, 2013     Mutations in some genes can occur de cortez, which means that a person s mutation occurred for the first time in them and was not inherited from a parent.  Now that they have the mutation, however, it can be passed on to future generations.    Genetic Testing  Genetic testing involves a blood test and will look for any harmful mutations that are associated with increased cancer risk.  If possible, it is recommended that the person(s) who has had cancer be tested before other family members.  That person will give us the most useful information about whether or not a specific gene is associated with the cancer in the family.    Results  There are three possible results of genetic testing:    Positive--a harmful mutation was identified in one or more of the genes    Negative--no mutation was identified in any of the 9 genes on this panel    Variant of unknown significance--a variation in one of the genes was identified, but it is unclear how this impacts cancer risk in the family    Advantages and Disadvantages   There are advantages and disadvantages to genetic  testing.  Advantages    May clarify your cancer risk    Can help you make medical decisions    May explain the cancers in your family    May give useful information to your family members (if you share your results)    Disadvantages    Possible negative emotional impact of learning about inherited cancer risk    Uncertainty in interpreting a negative test result in some situations    Possible genetic discrimination concerns (see below)    Genetic Information Nondiscrimination Act (LEENA)  LEENA is a federal law that protects individuals from health insurance or employment discrimination based on a genetic test result alone.  Although rare, there are currently no legal discrimination protections in terms of life insurance, long term care, or disability insurances.  Visit the [employer] website to learn more.    Reducing Cancer Risk  Each of the genes listed within this handout have nationally recognized cancer screening guidelines that would be recommended for individuals who test positive.  In addition to increased cancer screening, surgeries may be offered or recommended to reduce cancer risk.  Recommendations are based upon an individual s genetic test result as well as their personal and family history of cancer.    Questions to Think About Regarding Genetic Testing:    What effect will the test result have on me and my relationship with my family members if I have an inherited gene mutation?  If I don t have a gene mutation?    Should I share my test results, and how will my family react to this news, which may also affect them?    Are my children ready to learn new information that may one day affect their own health?        Hereditary Cancer Resources    FORCE: Facing Our Risk of Cancer Empowered facingourrisk.org   Bright Pink bebrightpink.org   Li-Fraumeni Syndrome Association lfsassociation.org   PTEN World PTENworld.com   Collaborative Group of the Americas on Inherited  Colorectal Cancer (CGA) [facility].com http://www.[facility].org/   Cancer Care cancercare.org   American Cancer Society (ACS) cancer.org   National Cancer [place] (NCI) cancer.gov       Cancer Risk Management Program [phone] ([phone])  ? Alanna Márquez, MS, [facility]  [phone]  ? Lisa Fournier, MS, [facility]  [phone]  ? Eun Hernandez, MS, [facility]  [phone]  ? Julia Gilliam, MS, [facility]  [phone]   ? Greg Hauser, MS, [facility]  [phone]    References  1. Trisha A, Samir PDP, Audra S, Marsha IRVING, Yamilka JE, Martín JL, Parker N, Malathi H, Ayah O, Jhonathan A, Jim B, Latoya P, Manangel S, Bryan DM, Wilmer N, Jannette E, Antonio H, Giorgio E, Jarek J, Sindy J, June B, Mai H, Thorlacius S, Eerola H, Nevbonniena H, Akbar K, Viktoria OP. Average risks of breast and ovarian cancer associated with BRCA1 or BRCA2 mutations detected in case series unselected for family history: a combined analysis of 222 studies. Am J Hum Yara. 2003;72:1117-30.  2. Sandy N, Ivette M, Stroud G.  BRCA1 and BRCA2 Hereditary Breast and Ovarian Cancer. Gene Reviews online. 2013.  3. Sage YC, Andrade S, Justyn G, Vargas S. Breast cancer risk among male BRCA1 and BRCA2 mutation carriers. J Natl Cancer Inst. 2007;99:1811-4.  4. Amos DG, Namita I, Kraig J, Adonis E, Po ER, Rogelio F. Risk of breast cancer in male BRCA2 carriers. J Med Yara. 2010;47:710-1.  5. Robert MH, Rinku J, Mele J, Eli LA, Angelica MS, Eng C. Lifetime cancer risks in individuals with germline PTEN mutations. Clin Cancer Res. 2012;18:400-7.  6. Christine MADRID. Cowden Syndrome: A Critical Review of the Clinical Literature. J Yara . 2009:18:13-27.  7. National Comprehensive Cancer Network. Clinical practice guidelines in oncology, colorectal cancer screening. Available online (registration required). 2015.  8. Trisha MARTIN et al. Breast-Cancer Risk in Families with Mutations in PALB2. NEJM. 2014; 371(6):497-506.

## 2017-07-14 NOTE — MR AVS SNAPSHOT
After Visit Summary   7/14/2017    Jojo Fuentes    MRN: [id number]           Patient Information     Date Of Birth          1955        Visit Information        Provider Department      7/14/2017 10:00 AM Lisa Fournier GC; GERA CELAYA;  2 118 CONSULT [facility]        Today's Diagnoses     Fallopian tube cancer, carcinoma, unspecified laterality (H)    -  1      Care Instructions          Assessing Cancer Risk  Only about 5-10% of cancers are thought to be due to an inherited cancer susceptibility gene.    These families often have:    Several people with the same or related types of cancer    Cancers diagnosed at a young age (before age 50)    Individuals with more than one primary cancer    Multiple generations of the family affected with cancer    Some people may be candidates for genetic testing of more than one gene.  For these families, genetic testing using a cancer panel may be offered.  These panels can test many genes at once known to increase the risk for gynecologic (and other) cancers:  BRCA1, BRCA2, BRIP1 MLH1, MSH2, MSH6, PMS2, EPCAM, PTEN, PALB2, RAD51C, RAD51D, and TP53. The purpose of this handout is to serve as a brief summary of the gynecologic cancer risk genes that have published clinical management guidelines for individuals who are found to carry a mutation.    ______________________________________________________________________________  Hereditary Breast and Ovarian Cancer Syndrome   (BRCA1 and BRCA2)  A single mutation in one of the copies of BRCA1 or BRCA2 increases the risk for breast and ovarian cancer, among others.  The risk for pancreatic cancer and melanoma may also be slightly increased in some families.  The chart below shows the chance that someone with a BRCA mutation would develop cancer in his or her lifetime1,2,3,4.        A person s ethnic background is also important to consider, as individuals of Ashkenazi Congregational ancestry  have a higher chance of having a BRCA gene mutation.  There are three BRCA mutations that occur more frequently in this population.    Emmanuel Syndrome   (MLH1, MSH2, MSH6, PMS2, and EPCAM)  Currently five genes are known to cause Emmanuel Syndrome: MLH1, MSH2, MSH6, PMS2, and EPCAM.  A single mutation in one of the Emmanuel Syndrome genes increases the risk for colon, endometrial, ovarian, and stomach cancers.  Other cancers that occur less commonly in Emmanuel Syndrome include urinary tract, skin, and brain cancers.  The chart below shows the chance that a person with Emmanuel syndrome would develop cancer in his or her lifetime7.      *Cancer risk varies depending on Emmanuel syndrome gene found    Cowden Syndrome   (PTEN)  Cowden syndrome is a hereditary condition that increases the risk for breast, thyroid, endometrial, and kidney cancer.  Cowden syndrome is caused by a mutation in the PTEN gene.  A single mutation in one of the copies of PTEN causes Cowden syndrome and increases cancer risk.  The chart below shows the chance that someone with a PTEN mutation would develop cancer in their lifetime5,6.  Other benign features seen in some individuals with Cowden syndrome include benign skin lesions (facial papules, keratoses, lipomas), learning disability, autism, thyroid nodules, colon polyps, and larger head size.      *One recent study found breast cancer risk to be increased to 85%  Li-Fraumeni Syndrome   (TP53)  Li-Fraumeni Syndrome (LFS) is a cancer predisposition syndrome caused by a mutation in the TP53 gene. A single mutation in one of the copies of TP53 increases the risk for multiple cancers. Individuals with LFS are at an increased risk for developing cancer at a young age. The general lifetime risk for development of cancer is 50% by age 30 and 90% by age 60.     Core Cancers: Sarcomas, Breast, Brain, Lung, Leukemias/Lymphomas, Adrenocortical carcinomas  Other Cancers: Gastrointestinal, Thyroid, Skin,  Genitourinary    Additional Genes  PALB2  Mutations in PALB2 have been shown to increase the risk of breast cancer up to 33-58% in some families; where individuals fall within this risk range is dependent upon family history. PALB2 mutations have also been associated with increased risk for pancreatic cancer, although this risk has not been quantified yet.  Individuals who inherit two PALB2 mutations--one from their mother and one from their father--have a condition called Fanconi Anemia.  This rare autosomal recessive condition is associated with short stature, developmental delay, bone marrow failure, and increased risk for childhood cancers.    BRIP1, RAD51C and RAD51D  Mutations in BRIP1, RAD51C, and RAD51D have been shown to increase the risk of ovarian cancer as well as female breast cancer.    ______________________________________________________________________________    Inheritance  All of the cancer syndromes reviewed above are inherited in an autosomal dominant pattern.  This means that if a parent has a mutation, each of his or her children will have a 50% chance of inheriting that same mutation.  Therefore, each child--male or female--would have a 50% chance of being at increased risk for developing cancer.      Image obtained from Genetics Home Reference, 2013     Mutations in some genes can occur de cortez, which means that a person s mutation occurred for the first time in them and was not inherited from a parent.  Now that they have the mutation, however, it can be passed on to future generations.    Genetic Testing  Genetic testing involves a blood test and will look for any harmful mutations that are associated with increased cancer risk.  If possible, it is recommended that the person(s) who has had cancer be tested before other family members.  That person will give us the most useful information about whether or not a specific gene is associated with the cancer in the family.    Results  There  are three possible results of genetic testing:    Positive--a harmful mutation was identified in one or more of the genes    Negative--no mutation was identified in any of the 9 genes on this panel    Variant of unknown significance--a variation in one of the genes was identified, but it is unclear how this impacts cancer risk in the family    Advantages and Disadvantages   There are advantages and disadvantages to genetic testing.  Advantages    May clarify your cancer risk    Can help you make medical decisions    May explain the cancers in your family    May give useful information to your family members (if you share your results)    Disadvantages    Possible negative emotional impact of learning about inherited cancer risk    Uncertainty in interpreting a negative test result in some situations    Possible genetic discrimination concerns (see below)    Genetic Information Nondiscrimination Act (LEENA)  LEENA is a federal law that protects individuals from health insurance or employment discrimination based on a genetic test result alone.  Although rare, there are currently no legal discrimination protections in terms of life insurance, long term care, or disability insurances.  Visit the National videof.me Research Fedora website to learn more.    Reducing Cancer Risk  Each of the genes listed within this handout have nationally recognized cancer screening guidelines that would be recommended for individuals who test positive.  In addition to increased cancer screening, surgeries may be offered or recommended to reduce cancer risk.  Recommendations are based upon an individual s genetic test result as well as their personal and family history of cancer.    Questions to Think About Regarding Genetic Testing:    What effect will the test result have on me and my relationship with my family members if I have an inherited gene mutation?  If I don t have a gene mutation?    Should I share my test results, and  how will my family react to this news, which may also affect them?    Are my children ready to learn new information that may one day affect their own health?        Hereditary Cancer Resources    FORCE: Facing Our Risk of Cancer Empowered facingourrisk.org   Bright Pink bebrightpink.org   Li-Fraumeni Syndrome Association lfsassociation.org   PTEN World PTENworld.com   Collaborative Group of the Americas on Inherited Colorectal Cancer (CGA) cgaicc.com http://www.facingourrisk.org/   Cancer Care cancercare.org   American Cancer Society (ACS) cancer.org   National Cancer Melba (NCI) cancer.gov       Cancer Risk Management Program [phone] ([phone])  ? Alanna Willi, MS, [facility]  [phone]  ? Lisa Irlanda, MS, [facility]  [phone]  ? Eun Hernandez, MS, [facility]  [phone]  ? Julia Gilliam, MS, [facility]  [phone]   ? Greg Hauser, MS, [facility]  [phone]    References  1. Samir Gore PDP, Audra S, Marsha IRVING, Yamilka JE, Martín JL, Leylaman N, Malathi H, Ayah O, Jhonathan A, Jim B, Latoya P, Faizan S, Bryan DM, Wilmer N, Jannette E, Antonio H, Giorgio E, Rolandoinski J, Gronwald J, June B, Tulinius H, Thorlacius S, Eerola H, Seven H, Akbar K, Viktoria OP. Average risks of breast and ovarian cancer associated with BRCA1 or BRCA2 mutations detected in case series unselected for family history: a combined analysis of 222 studies. Am J Hum Yara. 2003;72:1117-30.  2. Sandy N, Ivette M, Maximus G.  BRCA1 and BRCA2 Hereditary Breast and Ovarian Cancer. Gene Reviews online. 2013.  3. Sage YC, Andrade S, Justyn G, Vargas S. Breast cancer risk among male BRCA1 and BRCA2 mutation carriers. J Natl Cancer Inst. 2007;99:1811-4.  4. Amos BONNER, Namita I, Kraig J, Adonis E, Po ER, Rogelio F. Risk of breast cancer in male BRCA2 carriers. J Med Yara. 2010;47:710-1.  5. Robert FRYE, Rinku J, Mele J, Eli NEWTON, Angelica NICE, Janina C. Lifetime cancer risks in individuals with germline PTEN mutations. Clin Cancer  Res. 2012;18:400-7.  6. Christine MADRID. Cowden Syndrome: A Critical Review of the Clinical Literature. J Yara . 2009:18:13-27.  7. National Comprehensive Cancer Network. Clinical practice guidelines in oncology, colorectal cancer screening. Available online (registration required). 2015.  8. Trisha MARTIN et al. Breast-Cancer Risk in Families with Mutations in PALB2. NEJM. 2014; 371(6):497-506.                Follow-ups after your visit        Your next 10 appointments already scheduled     Jul 14, 2017 12:00 PM CDT   [facility] Draw with  [facility]   [facility] Draw ([facility])    [address] [place] [id number] [phone]            Jul 20, 2017 11:00 AM CDT   (Arrive by 10:45 AM)   Return Visit with Ellen Pitts MD   [facility] ([facility])    [address] [place] [id number] [phone]              Future tests that were ordered for you today     Open Future Orders        Priority Expected Expires Ordered    GynPlus - [facility] Test: Laboratory Miscellaneous Order Routine  7/14/2018 7/14/2017            Who to contact     If you have questions or need follow up information about today's clinic visit or your schedule please contact [facility] directly at [phone].  Normal or non-critical lab and imaging results will be communicated to you by MyChart, letter or phone within 4 business days after the clinic has received the results. If you do not hear from us within 7 days, please contact the clinic through MyChart or phone. If you have a critical or abnormal lab result, we will notify you by phone as soon as possible.  Submit refill requests through [employer] or call your pharmacy and they will forward the refill request to us. Please allow 3 business days for your refill to be completed.          Additional Information About Your  "Visit        [employer] Information     [employer] lets you send messages to your doctor, view your test results, renew your prescriptions, schedule appointments and more. To sign up, go to [email or web address] . Click on \"Log in\" on the left side of the screen, which will take you to the Welcome page. Then click on \"Sign up Now\" on the right side of the page.     You will be asked to enter the access code listed below, as well as some personal information. Please follow the directions to create your username and password.     Your access code is: [email or web address]  Expires: 2017  9:18 AM     Your access code will  in 90 days. If you need help or a new code, please call your [place] clinic or [phone].        Care EveryWhere ID     This is your Care EveryWhere ID. This could be used by other organizations to access your [place] medical records  [id number]         Blood Pressure from Last 3 Encounters:   17 110/72   16 127/82   16 132/80    Weight from Last 3 Encounters:   17 93 kg (205 lb)   16 87.8 kg (193 lb 8 oz)   16 86.7 kg (191 lb 3.2 oz)               Primary Care Provider Office Phone # Fax #    Aimee Huang [phone]       [address] [address]        Equal Access to Services     CRYSTAL PAYNE AH: Hadii aad ku hadasho Soomaali, waaxda luqadaha, qaybta kaalmada adeegyada, greg arroyo hayadri rivera . So [facility] [phone].    ATENCIÓN: Si habla español, tiene a mandujano disposición servicios gratuitos de asistencia lingüística. Llame al [phone].    We comply with applicable federal civil rights laws and [place] laws. We do not discriminate on the basis of race, color, national origin, age, disability sex, sexual orientation or gender identity.            Thank you!     Thank you for choosing [facility]  for your care. Our goal is always to provide you with excellent care. " Hearing back from our patients is one way we can continue to improve our services. Please take a few minutes to complete the written survey that you may receive in the mail after your visit with us. Thank you!             Your Updated Medication List - Protect others around you: Learn how to safely use, store and throw away your medicines at www.disposemymeds.org.          This list is accurate as of: 7/14/17 11:02 AM.  Always use your most recent med list.                   Brand Name Dispense Instructions for use Diagnosis    acetaminophen 325 MG tablet    TYLENOL    100 tablet    Take 2 tablets (650 mg) by mouth every 4 hours as needed for mild pain or fever    Ovarian mass, S/P laparotomy       calcium-vitamin D 600-400 MG-UNIT per tablet    CALTRATE    60 tablet    Take 1 tablet by mouth 2 times daily    Fallopian tube cancer, carcinoma, unspecified laterality (H)       enoxaparin 40 MG/0.4ML injection    LOVENOX    25 Syringe    Inject 0.4 mLs (40 mg) Subcutaneous every 24 hours    Ovarian mass, S/P laparotomy       GABAPENTIN PO      Take 100 mg by mouth daily Reported on 4/14/2017        ibuprofen 600 MG tablet    ADVIL/MOTRIN    100 tablet    Take 1 tablet (600 mg) by mouth every 6 hours as needed for other (inflammatory pain)    Ovarian mass, S/P laparotomy       oxyCODONE 5 MG IR tablet    ROXICODONE    40 tablet    Take 1-2 tablets (5-10 mg) by mouth every 4 hours as needed for moderate to severe pain    Ovarian mass, S/P laparotomy       polyethylene glycol Packet    MIRALAX/GLYCOLAX    30 packet    Take 17 g by mouth daily    Ovarian mass, S/P laparotomy       PRILOSEC PO      Take 40 mg by mouth every morning Reported on 4/14/2017        senna-docusate 8.6-50 MG per tablet    SENOKOT-S;PERICOLACE    100 tablet    Take 1-2 tablets by mouth 2 times daily    Ovarian mass, S/P laparotomy       simethicone 80 MG chewable tablet    MYLICON    180 tablet    Take 1 tablet (80 mg) by mouth 4 times daily as  needed for cramping (gas)    Ovarian mass, S/P laparotomy       VITAMIN D (CHOLECALCIFEROL) PO      Take 1,000 Units by mouth daily Reported on 4/14/2017

## 2017-07-14 NOTE — LETTER
7/14/2017       RE: Jojo Fuentes  [address]    [place] [address]     Dear Colleague,    Thank you for referring your patient, Jojo Fuentes, to the [facility]. Please see a copy of my visit note below.    7/14/2017    Referring Provider: Ellen Pitts MD    Presenting Information:   Jojo Fuentes came in to clinic for a follow-up visit today. She had previously been seen in the Cancer Risk Management Program at the [facility] on 11/11/2016 and 4/14/2017. Jojo came to clinic today to have her blood drawn for genetic testing via the GynPlus panel.  The genes on this panel are associated with hereditary ovarian cancer.    Discussion:    We previously reviewed the details of Jojo's family and personal history. See note from previous appointment for details.     We reviewed that insurance approved testing, and [employer] reports that her out of pocket cost for the test is $0.    We reviewed the goals of genetic testing again today, and reviewed the GynPlus test.  We discussed that some cancers (such as fallopian tube cancer) can be due to a mistake, or a mutation, in a gene.  We discussed that if a mutation is found in a person, they will be at a much higher risk for certain cancers than the general population.  These mistakes can be passed from generation to generation in a family.    We discussed again that genetic testing can help determine whether or not a cancer susceptibility mutation is present in a family, and as a result may provide information that could help clarify an individual's (and possibly other family members') cancer risks.  It may also help direct decisions about cancer screening and prevention options.  We reviewed that when someone has a mutation certain cancer risk genes, additional cancer screening can be done to screen for those cancers. We reviewed that finding a mutation in a gene would also help us determine cancer risk for  her children.    We discussed that each gene on the GynPlus panel is considered medically actionable. This means that there is a guideline in place that directs an individual's cancer screening plan, if a mutation is identified.    We reviewed the consent form again today.  I offered to have the  read the form to Jojo word-for-word, but she declined this. I explained each section of the consent form, and she verbalized understanding.  Consent was obtained and genetic testing for GynPlus was sent to [facility].     The information from this test may determine cancer screening recommendations as well as options for risk reducing surgeries for Jojo and family members.  These recommendations will be discussed in detail once genetic testing is completed.    Plan:  1. All of Jojo's questions were answered.   2. Jojo signed a consent form at the conclusion of this visit and had her blood drawn for GynPlus.   3. Jojo will return to clinic to discuss the results.  Turn around time: 4-5 weeks.     Face to face time 50 minutes.    Lisa Fournier MS, [facility]  Certified Genetic Counselor  P. [phone]  F. [phone]    ADDENDUM:  An in-person Angolan  was used during this appointment.

## 2017-07-14 NOTE — PROGRESS NOTES
7/14/2017    Referring Provider: Ellen Pitts MD    Presenting Information:   Jojo Fuentes came in to clinic for a follow-up visit today. She had previously been seen in the Cancer Risk Management Program at the [facility] on 11/11/2016 and 4/14/2017. Jojo came to clinic today to have her blood drawn for genetic testing via the GynPlus panel.  The genes on this panel are associated with hereditary ovarian cancer.    Discussion:    We previously reviewed the details of Jojo's family and personal history. See note from previous appointment for details.     We reviewed that insurance approved testing, and [employer] reports that her out of pocket cost for the test is $0.    We reviewed the goals of genetic testing again today, and reviewed the GynPlus test.  We discussed that some cancers (such as fallopian tube cancer) can be due to a mistake, or a mutation, in a gene.  We discussed that if a mutation is found in a person, they will be at a much higher risk for certain cancers than the general population.  These mistakes can be passed from generation to generation in a family.    We discussed again that genetic testing can help determine whether or not a cancer susceptibility mutation is present in a family, and as a result may provide information that could help clarify an individual's (and possibly other family members') cancer risks.  It may also help direct decisions about cancer screening and prevention options.  We reviewed that when someone has a mutation certain cancer risk genes, additional cancer screening can be done to screen for those cancers. We reviewed that finding a mutation in a gene would also help us determine cancer risk for her children.    We discussed that each gene on the GynPlus panel is considered medically actionable. This means that there is a guideline in place that directs an individual's cancer screening plan, if a mutation is identified.    We reviewed the  consent form again today.  I offered to have the  read the form to Jojo word-for-word, but she declined this. I explained each section of the consent form, and she verbalized understanding.  Consent was obtained and genetic testing for GynPlus was sent to [facility].     The information from this test may determine cancer screening recommendations as well as options for risk reducing surgeries for Jojo and family members.  These recommendations will be discussed in detail once genetic testing is completed.    Plan:  1. All of Jojo's questions were answered.   2. Jojo signed a consent form at the conclusion of this visit and had her blood drawn for GynPlus.   3. Jojo will return to clinic to discuss the results.  Turn around time: 4-5 weeks.     Face to face time 50 minutes.    Lisa Fournier MS, [facility]  Certified Genetic Counselor  P. [phone]  F. [phone]    ADDENDUM:  An in-person Jamaican  was used during this appointment.

## 2017-07-14 NOTE — LETTER
Cancer Risk Management  Program Locations    [facility]  [facility]  [facility]  [facility]  Mailing Address  Cancer Risk Management Program  [facility]  [address] [place] [address]    New patient appointments  [phone]  July 14, 2017    Jojo Fuentes  [address]    [place] [address]      Dear Jojo,    It was a pleasure meeting with you at the [facility] again on 7/14/2017.  Here is a copy of the progress note from your recent genetic counseling visit to the Cancer Risk Management Program.  If you have any additional questions, please feel free to call.    Referring Provider: Ellen Pitts MD    Presenting Information:   Jojo Fuentes came in to clinic for a follow-up visit today. She had previously been seen in the Cancer Risk Management Program at the [facility] on 11/11/2016 and 4/14/2017. Jojo came to clinic today to have her blood drawn for genetic testing via the GynPlus panel.  The genes on this panel are associated with hereditary ovarian cancer.    Discussion:    We previously reviewed the details of Jojo's family and personal history. See note from previous appointment for details.     We reviewed that insurance approved testing, and [employer] reports that her out of pocket cost for the test is $0.    We reviewed the goals of genetic testing again today, and reviewed the GynPlus test.  We discussed that some cancers (such as fallopian tube cancer) can be due to a mistake, or a mutation, in a gene.  We discussed that if a mutation is found in a person, they will be at a much higher risk for certain cancers than the general population.  These mistakes can be passed from generation to generation in a family.    We discussed again that genetic testing can help  determine whether or not a cancer susceptibility mutation is present in a family, and as a result may provide information that could help clarify an individual's (and possibly other family members') cancer risks.  It may also help direct decisions about cancer screening and prevention options.  We reviewed that when someone has a mutation certain cancer risk genes, additional cancer screening can be done to screen for those cancers. We reviewed that finding a mutation in a gene would also help us determine cancer risk for her children.    We discussed that each gene on the GynPlus panel is considered medically actionable. This means that there is a guideline in place that directs an individual's cancer screening plan, if a mutation is identified.    We reviewed the consent form again today.  I offered to have the  read the form to Jojo word-for-word, but she declined this. I explained each section of the consent form, and she verbalized understanding.  Consent was obtained and genetic testing for GynPlus was sent to [facility].     The information from this test may determine cancer screening recommendations as well as options for risk reducing surgeries for Jojo and family members.  These recommendations will be discussed in detail once genetic testing is completed.    Plan:  1. All of Jojo's questions were answered.   2. Jojo signed a consent form at the conclusion of this visit and had her blood drawn for GynPlus.   3. Jojo will return to clinic to discuss the results.  Turn around time: 4-5 weeks.     Face to face time 50 minutes.    Lisa Fournier MS, [facility]  Certified Genetic Counselor  P. [phone]  F. [phone]

## 2017-07-20 ENCOUNTER — ONCOLOGY VISIT (OUTPATIENT)
Dept: ONCOLOGY | Facility: CLINIC | Age: 62
End: 2017-07-20
Attending: OBSTETRICS & GYNECOLOGY
Payer: COMMERCIAL

## 2017-07-20 VITALS
WEIGHT: 207.89 LBS | RESPIRATION RATE: 17 BRPM | HEART RATE: 70 BPM | TEMPERATURE: 98.6 F | HEIGHT: 65 IN | OXYGEN SATURATION: 99 % | SYSTOLIC BLOOD PRESSURE: 124 MMHG | DIASTOLIC BLOOD PRESSURE: 79 MMHG | BODY MASS INDEX: 34.64 KG/M2

## 2017-07-20 DIAGNOSIS — Z85.43 HISTORY OF OVARIAN CANCER: ICD-10-CM

## 2017-07-20 DIAGNOSIS — Z85.43 HISTORY OF OVARIAN CANCER: Primary | ICD-10-CM

## 2017-07-20 LAB — CANCER AG125 SERPL-ACNC: 9 U/ML (ref 0–30)

## 2017-07-20 PROCEDURE — 96523 IRRIG DRUG DELIVERY DEVICE: CPT

## 2017-07-20 PROCEDURE — 99214 OFFICE O/P EST MOD 30 MIN: CPT | Mod: ZP | Performed by: OBSTETRICS & GYNECOLOGY

## 2017-07-20 PROCEDURE — 36591 DRAW BLOOD OFF VENOUS DEVICE: CPT

## 2017-07-20 PROCEDURE — 86304 IMMUNOASSAY TUMOR CA 125: CPT | Performed by: OBSTETRICS & GYNECOLOGY

## 2017-07-20 PROCEDURE — 99212 OFFICE O/P EST SF 10 MIN: CPT | Mod: ZF

## 2017-07-20 PROCEDURE — 25000128 H RX IP 250 OP 636: Performed by: OBSTETRICS & GYNECOLOGY

## 2017-07-20 RX ORDER — HEPARIN SODIUM (PORCINE) LOCK FLUSH IV SOLN 100 UNIT/ML 100 UNIT/ML
5 SOLUTION INTRAVENOUS
Status: COMPLETED | OUTPATIENT
Start: 2017-07-20 | End: 2017-07-20

## 2017-07-20 RX ADMIN — SODIUM CHLORIDE, PRESERVATIVE FREE 5 ML: 5 INJECTION INTRAVENOUS at 12:48

## 2017-07-20 ASSESSMENT — PAIN SCALES - GENERAL: PAINLEVEL: NO PAIN (0)

## 2017-07-20 NOTE — NURSING NOTE
"Chief Complaint   Patient presents with     Port Draw     labs drawn from port.     Port accessed with 20g 3/4\" gripper, labs drawn, port flushed with saline and heparin.    Gina Clarke RN  "

## 2017-07-20 NOTE — MR AVS SNAPSHOT
After Visit Summary   7/20/2017    Jojo Fuentes    MRN: [id number]           Patient Information     Date Of Birth          1955        Visit Information        Provider Department      7/20/2017 10:45 AM Ellen Pitts MD; LANGUAGE [facility]        Today's Diagnoses     History of ovarian cancer    -  1       Follow-ups after your visit        Follow-up notes from your care team     Return in about 8 months (around 3/9/2018).      Your next 10 appointments already scheduled     Jul 20, 2017 12:15 PM CDT   [facility] with  [facility]   [facility] ([facility])    [address] [place] [id number] [phone]            Mar 09, 2018 11:00 AM CST   (Arrive by 10:45 AM)   Return Visit with Ellen Pitts MD   [facility] ([facility])    [address] [place] [id number] [phone]              Future tests that were ordered for you today     Open Future Orders        Priority Expected Expires Ordered     Routine  7/20/2018 7/20/2017            Who to contact     If you have questions or need follow up information about today's clinic visit or your schedule please contact [facility] directly at [phone].  Normal or non-critical lab and imaging results will be communicated to you by MyChart, letter or phone within 4 business days after the clinic has received the results. If you do not hear from us within 7 days, please contact the clinic through MyChart or phone. If you have a critical or abnormal lab result, we will notify you by phone as soon as possible.  Submit refill requests through [employer] or call your pharmacy and they will forward the refill request to us. Please allow 3 business days for your refill to be completed.          Additional Information About Your Visit       "  MyChart Information     [employer] lets you send messages to your doctor, view your test results, renew your prescriptions, schedule appointments and more. To sign up, go to [email or web address] . Click on \"Log in\" on the left side of the screen, which will take you to the Welcome page. Then click on \"Sign up Now\" on the right side of the page.     You will be asked to enter the access code listed below, as well as some personal information. Please follow the directions to create your username and password.     Your access code is: [email or web address]  Expires: 10/17/2017  6:30 AM     Your access code will  in 90 days. If you need help or a new code, please call your [facility] clinic or [phone].        Care EveryWhere ID     This is your Care EveryWhere ID. This could be used by other organizations to access your [facility] medical records  [id number]        Your Vitals Were     Pulse Temperature Respirations Height Pulse Oximetry Breastfeeding?    70 98.6  F (37  C) (Oral) 17 1.651 m (5' 5\") 99% No    BMI (Body Mass Index)                   34.6 kg/m2            Blood Pressure from Last 3 Encounters:   17 124/79   17 110/72   16 127/82    Weight from Last 3 Encounters:   17 94.3 kg (207 lb 14.3 oz)   17 93 kg (205 lb)   16 87.8 kg (193 lb 8 oz)               Primary Care Provider Office Phone # Fax #    Aimee Huang [phone]       [place] [address] [facility] [address]        Equal Access to Services     [facility] Center[facility] AH: Hadii temi Bobby, waaudreyda luqadaha, qaybta kaalmada agda, greg bernard. So [facility] [phone].    ATENCIÓN: Si habla español, tiene a mandujano disposición servicios gratuitos de asistencia lingüística. Llame al [phone].    We comply with applicable federal civil rights laws and [place] laws. We do not discriminate on the basis of race, color, national origin, age, " disability sex, sexual orientation or gender identity.            Thank you!     Thank you for choosing [facility]  for your care. Our goal is always to provide you with excellent care. Hearing back from our patients is one way we can continue to improve our services. Please take a few minutes to complete the written survey that you may receive in the mail after your visit with us. Thank you!             Your Updated Medication List - Protect others around you: Learn how to safely use, store and throw away your medicines at www.disposemymeds.org.          This list is accurate as of: 7/20/17 11:38 AM.  Always use your most recent med list.                   Brand Name Dispense Instructions for use Diagnosis    acetaminophen 325 MG tablet    TYLENOL    100 tablet    Take 2 tablets (650 mg) by mouth every 4 hours as needed for mild pain or fever    Ovarian mass, S/P laparotomy       calcium-vitamin D 600-400 MG-UNIT per tablet    CALTRATE    60 tablet    Take 1 tablet by mouth 2 times daily    Fallopian tube cancer, carcinoma, unspecified laterality (H)       enoxaparin 40 MG/0.4ML injection    LOVENOX    25 Syringe    Inject 0.4 mLs (40 mg) Subcutaneous every 24 hours    Ovarian mass, S/P laparotomy       GABAPENTIN PO      Take 100 mg by mouth daily Reported on 4/14/2017        ibuprofen 600 MG tablet    ADVIL/MOTRIN    100 tablet    Take 1 tablet (600 mg) by mouth every 6 hours as needed for other (inflammatory pain)    Ovarian mass, S/P laparotomy       oxyCODONE 5 MG IR tablet    ROXICODONE    40 tablet    Take 1-2 tablets (5-10 mg) by mouth every 4 hours as needed for moderate to severe pain    Ovarian mass, S/P laparotomy       polyethylene glycol Packet    MIRALAX/GLYCOLAX    30 packet    Take 17 g by mouth daily    Ovarian mass, S/P laparotomy       PRILOSEC PO      Take 40 mg by mouth every morning Reported on 4/14/2017        senna-docusate 8.6-50 MG per tablet    SENOKOT-S;PERICOLACE     100 tablet    Take 1-2 tablets by mouth 2 times daily    Ovarian mass, S/P laparotomy       simethicone 80 MG chewable tablet    MYLICON    180 tablet    Take 1 tablet (80 mg) by mouth 4 times daily as needed for cramping (gas)    Ovarian mass, S/P laparotomy       VITAMIN D (CHOLECALCIFEROL) PO      Take 1,000 Units by mouth daily Reported on 4/14/2017

## 2017-07-20 NOTE — NURSING NOTE
"Oncology Rooming Note    July 20, 2017 11:06 AM   Jojo Fuentes is a 62 year old female who presents for:    Chief Complaint   Patient presents with     Oncology Clinic Visit     return patient visit for 3 month follow up related to Ovarian mass     Initial Vitals: /79  Pulse 70  Temp 98.6  F (37  C) (Oral)  Resp 17  Ht 1.651 m (5' 5\")  Wt 94.3 kg (207 lb 14.3 oz)  SpO2 99%  Breastfeeding? No  BMI 34.6 kg/m2 Estimated body mass index is 34.6 kg/(m^2) as calculated from the following:    Height as of this encounter: 1.651 m (5' 5\").    Weight as of this encounter: 94.3 kg (207 lb 14.3 oz). Body surface area is 2.08 meters squared.  No Pain (0) Comment: Data Unavailable   No LMP recorded. Patient is postmenopausal.  Allergies reviewed: Yes  Medications reviewed: Yes    Medications: Medication refills not needed today.  Pharmacy name entered into [employer] [place]: [id number] - [place], [place] - [address] AT [address] [place] [address] & [facility]    Clinical concerns: no concerns. Was unable to ask oncology distress and abuse screening questions due to provider walking in to see patient. dr. sandoval was notified.    5 minutes for nursing intake (face to face time)     Mayra Cruz CMA              "

## 2017-09-22 ENCOUNTER — OFFICE VISIT (OUTPATIENT)
Dept: ONCOLOGY | Facility: CLINIC | Age: 62
End: 2017-09-22
Attending: GENETIC COUNSELOR, MS
Payer: COMMERCIAL

## 2017-09-22 DIAGNOSIS — Z15.01 MONOALLELIC MUTATION OF BRIP1 GENE: ICD-10-CM

## 2017-09-22 DIAGNOSIS — Z15.89 MONOALLELIC MUTATION OF BRIP1 GENE: ICD-10-CM

## 2017-09-22 DIAGNOSIS — C57.00 FALLOPIAN TUBE CANCER, CARCINOMA, UNSPECIFIED LATERALITY (H): Primary | ICD-10-CM

## 2017-09-22 PROCEDURE — 96040 ZZH GENETIC COUNSELING, EACH 30 MINUTES: CPT | Mod: ZF | Performed by: GENETIC COUNSELOR, MS

## 2017-09-22 NOTE — LETTER
"9/22/2017       RE: Jojo Fuentes  [address]    [place] [address]     Dear Colleague,    Thank you for referring your patient, Jojo Fuentes, to the [facility]. Please see a copy of my visit note below.    9/22/2017    Referring Provider: Ellen Pitts MD    Presenting Information:   Jojo returned to the Cancer Risk Management Program at the [facility] to discuss her genetic testing results. Her blood was drawn on 7/14/2017 and we ordered GynPlus testing from [id number]. 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 Jojo's daughter accompanied her to this appointment. An in-person [employer]  was used during this session.    We reviewed again that this testing was done because some cancers (such as fallopian tube cancer) can be due to a \"mistake\", or a mutation, in a gene. We reviewed that our genes are like instructions in our body, and that certain instructions protect us from developing cancer. Some individuals have mistakes in their instructions, and are therefore at a higher risk to develop certain cancers.  These mistakes can be passed from generation to generation in a family.  We discussed that a mistake was found in one of Jojo's genes.    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 Likely pathogenic mutations are clinically treated as pathogenic. Specifically her mutation is called c.1936+1G>A. Harmful mutations in BRIP1 can cause increased risk for ovarian cancer and breast cancer.  Studies have shown that the risk for ovarian cancer is around 9% for individuals with a pathogenic BRIP1 mutation (compared to 1-2% in the general population), but the exact risks for breast cancer are not well defined at this time.  We discussed the impact of this testing on Jojo in detail.     We clarified that this testing does not tell if Jojo has cancer at " this time, nor can in predict with certainty if she will develop cancer in the future.  She will need to speak with Dr. Pitts regarding questions on her fallopian tube cancer, and follow up recommendations.     Genetic Testing Result: Variant of Uncertain Significance (VUS)  We discussed that there was a difference in one of Jojo's genes that is unclear (a variant of uncertain significance). 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  This variant is called c.1117G>A (p.G373R).  No other mutations were found in the MLH1 gene.      VUS Interpretation:  We discussed several different interpretations of this inconclusive test result.  It is not clear if this variant in the MLH1 gene is associated with increased cancer risk.  1. This variant may be a benign change that does not increase cancer risk.  2. This variant may be a harmful mutation that causes increased risk for cancer.  Harmful MLH1 mutations cause increased risk for ovarian cancer, colon cancer, and other cancers    The lab is working to determine if this variant is harmful or benign, and they will contact me if it is reclassified.  If the lab contacts me, I can then share with Jojo what was found.     Of note, Jojo tested negative for mutations in the following remaining genes on the GynPlus panel: BRCA1, BRCA2, EPCAM, MSH2, MSH6, PALB2, PMS2, PTEN, RAD51C, RAD51D, and TP53 genes.  This test involved sequencing and deletion/duplication analysis of all genes with the exception of EPCAM (deletions/duplications only).   Jojo cannot pass on a mutation in genes that she tested negative for to her children based on this test result.  Mutations in these genes do not skip generations.      Cancer Risks and Screening/Prevention:   Harmful mutations in BRIP1 can cause increased risk for ovarian cancer and breast cancer.  Studies have shown that the risk for ovarian cancer is around 9% (compared to 1-2% in the  general population) for individuals with a pathogenic BRIP1 mutation.  We discussed that the National Comprehensive Cancer Network (NCCN) recommends consideration of prophylactic salpingo-oophorectomy (surgical removal of the ovaries and fallopian tubes) for women with a pathogenic variant in BRIP1.      The exact risks for breast cancer are not well defined at this time. Some studies even suggest that there may not be an increased risk for breast cancer with BRIP1 mutation.  There are currently no specific medical management guidelines for breast cancer risk for individuals with BRIP1 mutations.  Jojo should follow general population breast screening (annual mammogram) with her physicians. She should discuss this with her physicians, who should make final screening recommendations.    Cancer Screening Recommendations:    The following screening is recommended for women who have a mutation in the BRIP1 gene:    Consideration of prophylactic salpingo-oophorectomy at ages 45-50. The National Comprehensive Cancer Network (NCCN) states that there is not enough evidence to make a firm recommendation for the age of the procedure, but has suggested that a conversation regarding surgery should be held at 45-50 years old.    Jojo has already had her ovaries and fallopian tubes removed due to her cancer diagnosis.  This information, however, would be important to her family members.     It is also important for Jojo and her relatives to refer back to the family history for additional appropriate cancer screening. Other population cancer screening, such as recommendations by the American Cancer Society, are also appropriate for Jojo and her family.  These screening recommendations may change if there are changes to Jojo's personal and/or family history.  Final screening recommendations should be made by each individual's managing physician.    Implications for Family Members:  We discussed that each of Jojo's  "children have a 50% chance of inheriting the same BRIP1 mutation.  Likewise, each of her siblings also has a 50% risk of having the same mutation.    Jojo's daughter asked how she or her siblings could go about genetic testing. I provided her with my business card today, which includes our scheduling phone number.  I would be happy to see her, or her siblings, in clinic, or help them (or other relatives) connect with a genetic counselor in their area if they would like to discuss testing.    We also discussed that, in rare situations in which both parents have a mutation (\"harmful mistake\") in the BRIP1 gene, their children each have a 25% risk for Fanconi anemia. Fanconi anemia is a rare condition with onset in childhood.  Fanconi anemia often results in physical abnormalities and increased risk for cancer in childhood.  For individuals of childbearing age with BRIP1 mutations, genetic counseling and genetic testing may be advised for their partners.    Plan:  1. I provided Jojo with a copy of her test results today  2. I will provide a \"Dear Relative\" letter for Jojo to share with her family members.  3. I will contact Jojo if the lab informs me that the MLH1 VUS has been reclassified.  This may change screening and testing recommendations for relatives.  4. She should contact me annually, or sooner if family history changes.  5. She plans to follow up with Dr. Pitts.    If  Jojo has additional questions, I encouraged her to contact  me directly at [phone].   Time spent face to face: 40 minutes  Lisa Fournier MS, [facility]  Certified Genetic Counselor  P. [phone]  F. [phone]        "

## 2017-09-22 NOTE — PROGRESS NOTES
"9/22/2017    Referring Provider: Ellen Pitts MD    Presenting Information:   Jojo returned to the Cancer Risk Management Program at the [facility] to discuss her genetic testing results. Her blood was drawn on 7/14/2017 and we ordered GynPlus testing from [employer]. 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 Jojo's daughter accompanied her to this appointment. An in-person [employer]  was used during this session.    We reviewed again that this testing was done because some cancers (such as fallopian tube cancer) can be due to a \"mistake\", or a mutation, in a gene. We reviewed that our genes are like instructions in our body, and that certain instructions protect us from developing cancer. Some individuals have mistakes in their instructions, and are therefore at a higher risk to develop certain cancers.  These mistakes can be passed from generation to generation in a family.  We discussed that a mistake was found in one of Jojo's genes.    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 Likely pathogenic mutations are clinically treated as pathogenic. Specifically her mutation is called c.1936+1G>A. Harmful mutations in BRIP1 can cause increased risk for ovarian cancer and breast cancer.  Studies have shown that the risk for ovarian cancer is around 9% for individuals with a pathogenic BRIP1 mutation (compared to 1-2% in the general population), but the exact risks for breast cancer are not well defined at this time.  We discussed the impact of this testing on Jojo in detail.     We clarified that this testing does not tell if Jojo has cancer at this time, nor can in predict with certainty if she will develop cancer in the future.  She will need to speak with Dr. Pitts regarding questions on her fallopian tube cancer, and follow up recommendations.     Genetic Testing Result: Variant of " Uncertain Significance (VUS)  We discussed that there was a difference in one of Jojo's genes that is unclear (a variant of uncertain significance). 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  This variant is called c.1117G>A (p.G373R).  No other mutations were found in the MLH1 gene.      VUS Interpretation:  We discussed several different interpretations of this inconclusive test result.  It is not clear if this variant in the MLH1 gene is associated with increased cancer risk.  1. This variant may be a benign change that does not increase cancer risk.  2. This variant may be a harmful mutation that causes increased risk for cancer.  Harmful MLH1 mutations cause increased risk for ovarian cancer, colon cancer, and other cancers    The lab is working to determine if this variant is harmful or benign, and they will contact me if it is reclassified.  If the lab contacts me, I can then share with Jojo what was found.     Of note, Jojo tested negative for mutations in the following remaining genes on the GynPlus panel: BRCA1, BRCA2, EPCAM, MSH2, MSH6, PALB2, PMS2, PTEN, RAD51C, RAD51D, and TP53 genes.  This test involved sequencing and deletion/duplication analysis of all genes with the exception of EPCAM (deletions/duplications only).   Jojo cannot pass on a mutation in genes that she tested negative for to her children based on this test result.  Mutations in these genes do not skip generations.      Cancer Risks and Screening/Prevention:   Harmful mutations in BRIP1 can cause increased risk for ovarian cancer and breast cancer.  Studies have shown that the risk for ovarian cancer is around 9% (compared to 1-2% in the general population) for individuals with a pathogenic BRIP1 mutation.  We discussed that the National Comprehensive Cancer Network (NCCN) recommends consideration of prophylactic salpingo-oophorectomy (surgical removal of the ovaries and fallopian tubes)  for women with a pathogenic variant in BRIP1.      The exact risks for breast cancer are not well defined at this time. Some studies even suggest that there may not be an increased risk for breast cancer with BRIP1 mutation.  There are currently no specific medical management guidelines for breast cancer risk for individuals with BRIP1 mutations.  Jojo should follow general population breast screening (annual mammogram) with her physicians. She should discuss this with her physicians, who should make final screening recommendations.    Cancer Screening Recommendations:    The following screening is recommended for women who have a mutation in the BRIP1 gene:    Consideration of prophylactic salpingo-oophorectomy at ages 45-50. The National Comprehensive Cancer Network (NCCN) states that there is not enough evidence to make a firm recommendation for the age of the procedure, but has suggested that a conversation regarding surgery should be held at 45-50 years old.    Jojo has already had her ovaries and fallopian tubes removed due to her cancer diagnosis.  This information, however, would be important to her family members.     It is also important for Jojo and her relatives to refer back to the family history for additional appropriate cancer screening. Other population cancer screening, such as recommendations by the American Cancer Society, are also appropriate for Jojo and her family.  These screening recommendations may change if there are changes to Jojo's personal and/or family history.  Final screening recommendations should be made by each individual's managing physician.    Implications for Family Members:  We discussed that each of Jojo's children have a 50% chance of inheriting the same BRIP1 mutation.  Likewise, each of her siblings also has a 50% risk of having the same mutation.    Jojo's daughter asked how she or her siblings could go about genetic testing. I provided her with my  "business card today, which includes our scheduling phone number.  I would be happy to see her, or her siblings, in clinic, or help them (or other relatives) connect with a genetic counselor in their area if they would like to discuss testing.    We also discussed that, in rare situations in which both parents have a mutation (\"harmful mistake\") in the BRIP1 gene, their children each have a 25% risk for Fanconi anemia. Fanconi anemia is a rare condition with onset in childhood.  Fanconi anemia often results in physical abnormalities and increased risk for cancer in childhood.  For individuals of childbearing age with BRIP1 mutations, genetic counseling and genetic testing may be advised for their partners.    Plan:  1. I provided Jojo with a copy of her test results today  2. I will provide a \"Dear Relative\" letter for Jojo to share with her family members.  3. I will contact Jojo if the lab informs me that the MLH1 VUS has been reclassified.  This may change screening and testing recommendations for relatives.  4. She should contact me annually, or sooner if family history changes.  5. She plans to follow up with Dr. Pitts.    If  Jojo has additional questions, I encouraged her to contact  me directly at [phone].   Time spent face to face: 40 minutes  Lisa Fournier MS, [place] [facility] [place]  Certified Genetic Counselor  P. [phone]  F. [phone]  "

## 2017-09-22 NOTE — LETTER
"    Cancer Risk Management  Program Locations    [facility]  [facility]  Mailing Address  Cancer Risk Management Program  [facility]  [address]  [address]  [place] [address]    New patient appointments  [phone]  September 22, 2017    Jojo Fuentes  [address]    [place] [address]      Dear Jojo,    It was a pleasure meeting with you at the [facility] on 9/22/2017.  Here is a copy of the progress note from your recent genetic counseling visit to the Cancer Risk Management Program.  If you have any additional questions, please feel free to call.    Referring Provider: Ellen Pitts MD    Presenting Information:   Jojo returned to the Cancer Risk Management Program at the [facility] to discuss her genetic testing results. Her blood was drawn on 7/14/2017 and we ordered GynPlus testing from [employer]. 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 Jojo's daughter accompanied her to this appointment. An in-person [employer]  was used during this session.    We reviewed again that this testing was done because some cancers (such as fallopian tube cancer) can be due to a \"mistake\", or a mutation, in a gene. We reviewed that our genes are like instructions in our body, and that certain instructions protect us from developing cancer. Some individuals have mistakes in their instructions, and are therefore at a higher risk to develop certain cancers.  These mistakes can be passed from generation to generation in a family.  We discussed that a mistake was found in one of Jojo's genes.    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 Likely pathogenic mutations are clinically treated as " pathogenic. Specifically her mutation is called c.1936+1G>A. Harmful mutations in BRIP1 can cause increased risk for ovarian cancer and breast cancer.  Studies have shown that the risk for ovarian cancer is around 9% for individuals with a pathogenic BRIP1 mutation (compared to 1-2% in the general population), but the exact risks for breast cancer are not well defined at this time.  We discussed the impact of this testing on Jojo in detail.     We clarified that this testing does not tell if Jojo has cancer at this time, nor can in predict with certainty if she will develop cancer in the future.  She will need to speak with Dr. Pitts regarding questions on her fallopian tube cancer, and follow up recommendations.     Genetic Testing Result: Variant of Uncertain Significance (VUS)  We discussed that there was a difference in one of Jojo's genes that is unclear (a variant of uncertain significance). 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  This variant is called c.1117G>A (p.G373R).  No other mutations were found in the MLH1 gene.      VUS Interpretation:  We discussed several different interpretations of this inconclusive test result.  It is not clear if this variant in the MLH1 gene is associated with increased cancer risk.  1. This variant may be a benign change that does not increase cancer risk.  2. This variant may be a harmful mutation that causes increased risk for cancer.  Harmful MLH1 mutations cause increased risk for ovarian cancer, colon cancer, and other cancers    The lab is working to determine if this variant is harmful or benign, and they will contact me if it is reclassified.  If the lab contacts me, I can then share with Jojo what was found.     Of note, Jojo tested negative for mutations in the following remaining genes on the GynPlus panel: BRCA1, BRCA2, EPCAM, MSH2, MSH6, PALB2, PMS2, PTEN, RAD51C, RAD51D, and TP53 genes.  This test  involved sequencing and deletion/duplication analysis of all genes with the exception of EPCAM (deletions/duplications only).   Jojo cannot pass on a mutation in genes that she tested negative for to her children based on this test result.  Mutations in these genes do not skip generations.      Cancer Risks and Screening/Prevention:   Harmful mutations in BRIP1 can cause increased risk for ovarian cancer and breast cancer.  Studies have shown that the risk for ovarian cancer is around 9% (compared to 1-2% in the general population) for individuals with a pathogenic BRIP1 mutation.  We discussed that the National Comprehensive Cancer Network (NCCN) recommends consideration of prophylactic salpingo-oophorectomy (surgical removal of the ovaries and fallopian tubes) for women with a pathogenic variant in BRIP1.      The exact risks for breast cancer are not well defined at this time. Some studies even suggest that there may not be an increased risk for breast cancer with BRIP1 mutation.  There are currently no specific medical management guidelines for breast cancer risk for individuals with BRIP1 mutations.  Jojo should follow general population breast screening (annual mammogram) with her physicians. She should discuss this with her physicians, who should make final screening recommendations.    Cancer Screening Recommendations:    The following screening is recommended for women who have a mutation in the BRIP1 gene:    Consideration of prophylactic salpingo-oophorectomy at ages 45-50. The National Comprehensive Cancer Network (NCCN) states that there is not enough evidence to make a firm recommendation for the age of the procedure, but has suggested that a conversation regarding surgery should be held at 45-50 years old.    Jojo has already had her ovaries and fallopian tubes removed due to her cancer diagnosis.  This information, however, would be important to her family members.     It is also important for  "Jojo and her relatives to refer back to the family history for additional appropriate cancer screening. Other population cancer screening, such as recommendations by the American Cancer Society, are also appropriate for Jojo and her family.  These screening recommendations may change if there are changes to Jojo's personal and/or family history.  Final screening recommendations should be made by each individual's managing physician.    Implications for Family Members:  We discussed that each of Jojo's children have a 50% chance of inheriting the same BRIP1 mutation.  Likewise, each of her siblings also has a 50% risk of having the same mutation.    Jojo's daughter asked how she or her siblings could go about genetic testing. I provided her with my business card today, which includes our scheduling phone number.  I would be happy to see her, or her siblings, in clinic, or help them (or other relatives) connect with a genetic counselor in their area if they would like to discuss testing.    We also discussed that, in rare situations in which both parents have a mutation (\"harmful mistake\") in the BRIP1 gene, their children each have a 25% risk for Fanconi anemia. Fanconi anemia is a rare condition with onset in childhood.  Fanconi anemia often results in physical abnormalities and increased risk for cancer in childhood.  For individuals of childbearing age with BRIP1 mutations, genetic counseling and genetic testing may be advised for their partners.    Plan:  1. I provided Jojo with a copy of her test results today  2. I will provide a \"Dear Relative\" letter for Jojo to share with her family members.  3. I will contact Jojo if the lab informs me that the MLH1 VUS has been reclassified.  This may change screening and testing recommendations for relatives.  4. She should contact me annually, or sooner if family history changes.  5. She plans to follow up with Dr. Pitts.    If  Jojo has additional " questions, I encouraged her to contact  me directly at [phone].   Time spent face to face: 40 minutes  Lisa Fournier MS, [place] [facility] [place]  Certified Genetic Counselor  P. [phone]  F. [phone]

## 2017-09-22 NOTE — MR AVS SNAPSHOT
"              After Visit Summary   9/22/2017    Jojo Fuentes    MRN: [id number]           Patient Information     Date Of Birth          1955        Visit Information        Provider Department      9/22/2017 7:45 AM Lisa Fournier GC; LANGUAGE BAN;  [address] [facility]        Today's Diagnoses     Fallopian tube cancer, carcinoma, unspecified laterality (H)    -  1    Monoallelic mutation of BRIP1 gene           Follow-ups after your visit        Your next 10 appointments already scheduled     Mar 09, 2018 11:00 AM CST   (Arrive by 10:45 AM)   Return Visit with Ellen Pitts MD   [facility]    [address]  [address] [facility] [address] [phone]              Who to contact     If you have questions or need follow up information about today's clinic visit or your schedule please contact [facility] directly at [phone].  Normal or non-critical lab and imaging results will be communicated to you by Vidienthart, letter or phone within 4 business days after the clinic has received the results. If you do not hear from us within 7 days, please contact the clinic through LIKECHARITYt or phone. If you have a critical or abnormal lab result, we will notify you by phone as soon as possible.  Submit refill requests through Nopsec or call your pharmacy and they will forward the refill request to us. Please allow 3 business days for your refill to be completed.          Additional Information About Your Visit        Vidienthart Information     Nopsec lets you send messages to your doctor, view your test results, renew your prescriptions, schedule appointments and more. To sign up, go to [email or web address] . Click on \"Log in\" on the left side of the screen, which will take you to the Welcome page. Then click on \"Sign up Now\" on the right side of the page.     You will be asked to enter the " access code listed below, as well as some personal information. Please follow the directions to create your username and password.     Your access code is: [email or web address]  Expires: 10/17/2017  6:30 AM     Your access code will  in 90 days. If you need help or a new code, please call your [facility] or [phone].        Care EveryWhere ID     This is your Care EveryWhere ID. This could be used by other organizations to access your [place] medical records  [id number]         Blood Pressure from Last 3 Encounters:   17 124/79   17 110/72   16 127/82    Weight from Last 3 Encounters:   17 94.3 kg (207 lb 14.3 oz)   17 93 kg (205 lb)   16 87.8 kg (193 lb 8 oz)              Today, you had the following     No orders found for display       Primary Care Provider Office Phone # Fax #    Aimee Huang [phone]       [address] [address]        Equal Access to Services     [facility]: Hadii aad ku hadasho Soomaali, waaxda luqadaha, qaybta kaalmada adeegyada, greg rivera . So [facility] [phone].    ATENCIÓN: Si habla español, tiene a mandujano disposición servicios gratuitos de asistencia lingüística. [facility] [phone].    We comply with applicable federal civil rights laws and [place] laws. We do not discriminate on the basis of race, color, national origin, age, disability sex, sexual orientation or gender identity.            Thank you!     Thank you for choosing [facility]  for your care. Our goal is always to provide you with excellent care. Hearing back from our patients is one way we can continue to improve our services. Please take a few minutes to complete the written survey that you may receive in the mail after your visit with us. Thank you!             Your Updated Medication List - Protect others around you: Learn how to safely use, store and throw away  your medicines at www.disposemymeds.org.          This list is accurate as of: 9/22/17  1:13 PM.  Always use your most recent med list.                   Brand Name Dispense Instructions for use Diagnosis    acetaminophen 325 MG tablet    TYLENOL    100 tablet    Take 2 tablets (650 mg) by mouth every 4 hours as needed for mild pain or fever    Ovarian mass, S/P laparotomy       calcium-vitamin D 600-400 MG-UNIT per tablet    CALTRATE    60 tablet    Take 1 tablet by mouth 2 times daily    Fallopian tube cancer, carcinoma, unspecified laterality (H)       enoxaparin 40 MG/0.4ML injection    LOVENOX    25 Syringe    Inject 0.4 mLs (40 mg) Subcutaneous every 24 hours    Ovarian mass, S/P laparotomy       GABAPENTIN PO      Take 100 mg by mouth daily Reported on 4/14/2017        ibuprofen 600 MG tablet    ADVIL/MOTRIN    100 tablet    Take 1 tablet (600 mg) by mouth every 6 hours as needed for other (inflammatory pain)    Ovarian mass, S/P laparotomy       oxyCODONE 5 MG IR tablet    ROXICODONE    40 tablet    Take 1-2 tablets (5-10 mg) by mouth every 4 hours as needed for moderate to severe pain    Ovarian mass, S/P laparotomy       polyethylene glycol Packet    MIRALAX/GLYCOLAX    30 packet    Take 17 g by mouth daily    Ovarian mass, S/P laparotomy       PRILOSEC PO      Take 40 mg by mouth every morning Reported on 4/14/2017        senna-docusate 8.6-50 MG per tablet    SENOKOT-S;PERICOLACE    100 tablet    Take 1-2 tablets by mouth 2 times daily    Ovarian mass, S/P laparotomy       simethicone 80 MG chewable tablet    MYLICON    180 tablet    Take 1 tablet (80 mg) by mouth 4 times daily as needed for cramping (gas)    Ovarian mass, S/P laparotomy       VITAMIN D (CHOLECALCIFEROL) PO      Take 1,000 Units by mouth daily Reported on 4/14/2017

## 2017-09-22 NOTE — LETTER
"    Cancer Risk Management  Program [facility]  [facility]  Mailing Address  Cancer Risk Management Program  [facility]  [address]  [place] [address]    New patient appointments  [phone]  9/22/2017    Dear Relative:  The purpose of this letter is to inform you that your relative recently underwent genetic counseling and genetic testing due to the family history of fallopian tube cancer.  The testing done through [facility] identified a mutation in the BRIP1 gene.  Specifically, the mutation is called c.1936+1G>A.  Your relative's accession number connected to their test report is [id number].  Our genes act like instructions for our bodies. Some of these genes, or \"instructions\", protect our bodies from developing cancer.  Sometimes, these instructions contain a \"mistake\".  These \"mistakes\" are called \"mutations\", which causes a specific gene to stop working properly.    Ultimately, individuals who have a mutation in this BRIP1 gene have an increased risk for ovarian cancer and breast cancer. BRIP1 mutations increase the lifetime risk for ovarian cancer up to 9%, compared to 1-2% in the general population.  Mutations in this gene have also been associated with an increased risk for breast cancer; however the exact risks are not well defined at this time.    If women are found to have a mutation in the BRIP1 gene, we would discuss risk reduction surgery that can prevent the development of ovarian cancer.  It is important to note that both men and women can have a mutation in the BRIP1 gene.  A parent (mother or father) who has the mutation has a 50% chance of passing this mutation on to each of their children.   In rare situations in which both parents have a mutation in the BRIP1 gene, their " children each have a 25% risk for Fanconi anemia. Fanconi anemia is a rare condition with onset in childhood.  Fanconi anemia often results in physical abnormalities, growth retardation, bone marrow failure, and increased risk for cancer.  For individuals of childbearing age with BRIP1 mutations, genetic counseling and genetic testing may be advised for their partners.  I understand that this may be surprising, unexpected, and even scary news.  Because this mutation has been identified in your family, you are at risk for having the same mutation.  As mentioned earlier, your children may also be at risk.  Thus, I encourage you to contact me with questions or to schedule a genetic counseling appointment.  If you do not live in the [facility] area, I would be happy to provide you with contact information for genetic counselors in your city or state.  Genetic counselors can be found by visiting [email or web address].   Scheduling a genetic counseling appointment does not mean you have to undergo genetic testing.  The decision to pursue such testing is a very personal one that is discussed in more detail during the session.  Much of cancer genetic counseling is providing valuable information to individuals who are impacted by genetic information such as this.    Please feel free to contact me with any questions or concerns.  I can be reached at [phone].  Sincerely,   Lisa Fournier MS, [facility]  Certified Genetic Counselor  P. [phone]  F. [phone]

## 2018-01-31 LAB — LAB SCANNED RESULT: ABNORMAL

## 2018-03-30 ENCOUNTER — ONCOLOGY VISIT (OUTPATIENT)
Dept: ONCOLOGY | Facility: CLINIC | Age: 63
End: 2018-03-30
Attending: OBSTETRICS & GYNECOLOGY
Payer: COMMERCIAL

## 2018-03-30 VITALS
WEIGHT: 219.14 LBS | SYSTOLIC BLOOD PRESSURE: 130 MMHG | BODY MASS INDEX: 36.51 KG/M2 | DIASTOLIC BLOOD PRESSURE: 79 MMHG | TEMPERATURE: 97.6 F | OXYGEN SATURATION: 98 % | HEIGHT: 65 IN | HEART RATE: 73 BPM

## 2018-03-30 DIAGNOSIS — Z85.43 PERSONAL HISTORY OF OVARIAN CANCER: Primary | ICD-10-CM

## 2018-03-30 PROCEDURE — 99213 OFFICE O/P EST LOW 20 MIN: CPT | Mod: ZP | Performed by: OBSTETRICS & GYNECOLOGY

## 2018-03-30 PROCEDURE — G0463 HOSPITAL OUTPT CLINIC VISIT: HCPCS | Mod: ZF

## 2018-03-30 RX ORDER — LIDOCAINE/PRILOCAINE 2.5 %-2.5%
1 CREAM (GRAM) TOPICAL
COMMUNITY
Start: 2016-06-27

## 2018-03-30 RX ORDER — FLUTICASONE PROPIONATE 50 MCG
2 SPRAY, SUSPENSION (ML) NASAL
COMMUNITY
Start: 2017-08-24

## 2018-03-30 RX ORDER — CETIRIZINE HYDROCHLORIDE 10 MG/1
10 TABLET ORAL
COMMUNITY
Start: 2017-08-24

## 2018-03-30 ASSESSMENT — PAIN SCALES - GENERAL: PAINLEVEL: NO PAIN (0)

## 2018-03-30 NOTE — MR AVS SNAPSHOT
"              After Visit Summary   3/30/2018    Jojo Fuentes    MRN: [id number]           Patient Information     Date Of Birth          1955        Visit Information        Provider Department      3/30/2018 11:45 AM Ellen Pitts MD; [employer] [facility]        Today's Diagnoses     Personal history of ovarian cancer    -  1       Follow-ups after your visit        Your next 10 appointments already scheduled     Oct 05, 2018 12:00 PM CDT   (Arrive by 11:45 AM)   Return Visit with Ellen Pitts MD   [facility] ([facility])    [address]  [address]  [facility] [address] [phone]              Who to contact     If you have questions or need follow up information about today's clinic visit or your schedule please contact [facility] directly at [phone].  Normal or non-critical lab and imaging results will be communicated to you by MyChart, letter or phone within 4 business days after the clinic has received the results. If you do not hear from us within 7 days, please contact the clinic through MyChart or phone. If you have a critical or abnormal lab result, we will notify you by phone as soon as possible.  Submit refill requests through [employer] or call your pharmacy and they will forward the refill request to us. Please allow 3 business days for your refill to be completed.          Additional Information About Your Visit        MyChart Information     [employer] lets you send messages to your doctor, view your test results, renew your prescriptions, schedule appointments and more. To sign up, go to [email or web address] . Click on \"Log in\" on the left side of the screen, which will take you to the Welcome page. Then click on \"Sign up Now\" on the right side of the page.     You will be asked to enter the access code listed below, as well as some personal information. Please " "follow the directions to create your username and password.     Your access code is: [email or web address]  Expires: 6/3/2018 10:28 PM     Your access code will  in 90 days. If you need help or a new code, please call your [place] clinic or [phone].        Care EveryWhere ID     This is your Care EveryWhere ID. This could be used by other organizations to access your [place] medical records  [id number]        Your Vitals Were     Pulse Temperature Height Pulse Oximetry BMI (Body Mass Index)       73 97.6  F (36.4  C) (Oral) 1.651 m (5' 5\") 98% 36.47 kg/m2        Blood Pressure from Last 3 Encounters:   18 130/79   17 124/79   17 110/72    Weight from Last 3 Encounters:   18 99.4 kg (219 lb 2.2 oz)   17 94.3 kg (207 lb 14.3 oz)   17 93 kg (205 lb)               Primary Care Provider Office Phone # Fax #    Aimee Huang [phone]       [address] [address]        Equal Access to Services     CRYSTAL PAYNE : Hadii aad ku hadasho Soomaali, waaxda luqadaha, qaybta kaalmada adeegyada, waxay idiin hayvimaln [place] . So [facility] [phone].    ATENCIÓN: Si habla español, tiene a mandujano disposición servicios gratuitos de asistencia lingüística. rené al [phone].    We comply with applicable federal civil rights laws and [place] laws. We do not discriminate on the basis of race, color, national origin, age, disability, sex, sexual orientation, or gender identity.            Thank you!     Thank you for choosing [facility]  for your care. Our goal is always to provide you with excellent care. Hearing back from our patients is one way we can continue to improve our services. Please take a few minutes to complete the written survey that you may receive in the mail after your visit with us. Thank you!             Your Updated Medication List - Protect others around you: Learn how to safely use, " store and throw away your medicines at www.disposemymeds.org.          This list is accurate as of 3/30/18 11:59 PM.  Always use your most recent med list.                   Brand Name Dispense Instructions for use Diagnosis    acetaminophen 325 MG tablet    TYLENOL    100 tablet    Take 2 tablets (650 mg) by mouth every 4 hours as needed for mild pain or fever    Ovarian mass, S/P laparotomy       calcium-vitamin D 600-400 MG-UNIT per tablet    CALTRATE    60 tablet    Take 1 tablet by mouth 2 times daily    Fallopian tube cancer, carcinoma, unspecified laterality (H)       cetirizine 10 MG tablet    zyrTEC     Take 10 mg by mouth        enoxaparin 40 MG/0.4ML injection    LOVENOX    25 Syringe    Inject 0.4 mLs (40 mg) Subcutaneous every 24 hours    Ovarian mass, S/P laparotomy       fluticasone 50 MCG/ACT spray    FLONASE     2 sprays        GABAPENTIN PO      Take 100 mg by mouth daily Reported on 4/14/2017        ibuprofen 600 MG tablet    ADVIL/MOTRIN    100 tablet    Take 1 tablet (600 mg) by mouth every 6 hours as needed for other (inflammatory pain)    Ovarian mass, S/P laparotomy       lidocaine-prilocaine cream    EMLA     Apply 1 Application topically        oxyCODONE IR 5 MG tablet    ROXICODONE    40 tablet    Take 1-2 tablets (5-10 mg) by mouth every 4 hours as needed for moderate to severe pain    Ovarian mass, S/P laparotomy       polyethylene glycol Packet    MIRALAX/GLYCOLAX    30 packet    Take 17 g by mouth daily    Ovarian mass, S/P laparotomy       PRILOSEC PO      Take 40 mg by mouth every morning Reported on 4/14/2017        senna-docusate 8.6-50 MG per tablet    SENOKOT-S;PERICOLACE    100 tablet    Take 1-2 tablets by mouth 2 times daily    Ovarian mass, S/P laparotomy       simethicone 80 MG chewable tablet    MYLICON    180 tablet    Take 1 tablet (80 mg) by mouth 4 times daily as needed for cramping (gas)    Ovarian mass, S/P laparotomy       VITAMIN D (CHOLECALCIFEROL) PO      Take  1,000 Units by mouth daily Reported on 4/14/2017

## 2018-03-30 NOTE — LETTER
"3/30/2018       RE: Jojo Fuentes  [address]    [place] [address]     Dear Colleague,    Thank you for referring your patient, Jojo Fuentes, to the [facility]. Please see a copy of my visit note below.    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 16: XL//BSO/OMX/suboptimal tumor debulking by Dr Lopez (operative notes conflicting regarding extent of residual disease).   2.  Received 6 cycles of adjuvant dose dense chemotherapy in [facility] with Dr Licea (dose dense x 3 cycles, then q 3 week for cycles 4-6)  Per review of the notes, has had some issues with pancytopenia, but no major treatment delays   3. 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 Likely pathogenic mutations are clinically treated as pathogenic. Specifically her mutation is called c.1936+1G>A. Harmful mutations in BRIP1 can cause increased risk for ovarian cancer and breast cancer.  Studies have shown that the risk for ovarian cancer is around 9% for individuals with a pathogenic BRIP1 mutation (compared to 1-2% in the general population), but the exact risks for breast cancer are not well defined at this time.  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  This variant is called c.1117G>A (p.G373R).  No other mutations were found in the MLH1 gene.\"        Ms Jojo Fuentes is a 61 year old year old [place]an female who presents for followup regarding advanced ovarian cancer. She is here today with a Danish .      Today, she feels great. She is enjoying life. Her family is doing well. No pain or bloating. No bleeding. Appetite " "is low, but no changes.   Port is out.   She is being followed closely in [facility] and had a Ca-125 in December which was 20.6          Review of Systems:  Systemic:No weight changes.    Skin : No skin changes or new lesions.   Eye : No changes in vision.   Pulmonary: No cough or SOB.   Cardiovascular: No CP or palpitations  Gastrointestinal : No diarrhea, constipation, abdominal pain. Bowel habits normal. Nausea  Genitourinary: No dysuria, urgency or bleeding  Psychiatric: No depression or anxiety  Hematologic : No palpable lymph nodes.   Endocrine : No hot flashes. No heat/cold intolerance.      Neurological: No headaches, some numbness.         Past Medical History:  Chronic pain  Ovarian cancer      Past Surgical History:  KATALINA, BSO, LND       OBGYN history and Health Maintenance:    Last Mamogram: normal  Last Colonoscopy: 2016 normal      Current Medications:   Reviewed in EPIC     Allergies: Ascorbate (swelling)         Social History:  Never smoker. Denies alcohol use.  Work: homemaker  Ethnicity identification: Colombian  Preferred language: Colombian  Lives at home with: Daughters     Family History:   The patient's family history is notable for no known gyn, colon, breast malignanices although limited knowledge of disease in previous generations     Physical Exam:   /79 (BP Location: Right arm, Patient Position: Sitting, Cuff Size: Adult Regular)  Pulse 73  Temp 97.6  F (36.4  C) (Oral)  Ht 1.651 m (5' 5\")  Wt 99.4 kg (219 lb 2.2 oz)  SpO2 98%  BMI 36.47 kg/m2      General: Alert and oriented, no acute distress.   Psych: Mood stable. Bright affect.   Cardiovascular: RRR, no murmors  Pulmonary: Lungs clear . Normal respiratory effort  GI: Obesity. No distention. No masses. No hernia. Incision is well healed  Lymph: No enlarge lymph nodes in neck or groin  : Evidence of previous genital surgery with absent labia minora and minimal clitoris. Cervix multiparous and present. Cervix smooth " except.  Nabothain cysts on cervix stable.   Chest:IV port out     Ca125:   6/2016: 143  8/12/16: 25.3  9/16: 14  11/11/2016: 10  2/27/2017 (in [facility]): 12  4/14/17: 8  7/20/17: 9  12/2017: 20 (in [facility])           Assessment:     Jojo Fuentes is a 61 year old woman with Advanced ovarian/fallopian adenocarcinoma, stage IIIC suboptimally debulked, completed adjuvant carbo/taxol.          Plan:      1.)   Ovarian/fallopian tube cancer: JOAN based on exam. Ca-125 has risen slightly in December, but she is completely asymptomatic. We did discuss the role of Ca-125 testing (through  today). She has plans to get his checked in [facility] in 2 months. Given that she is asymptomatic and with a normal exam today, I think waiting 2 months would be very reasonable.       -Followup in [facility] as scheduled. If Ca-125 elevated, would recommend CT scan  -We can keep following her here as well. I explained that it is also OK if she wants to centralize all of her care in [facility]. She would like to also have surveillance here. Therefore, will see her back in 6 months.      2.)                     Genetic risk factors were assessed:  BRIP-1 mutation . Pathogenic. Risk for breat and ovarian cancer. Did attempt to explain this again through the interpreted but I am unsure if she understood the significance of this.   -Recommend that kids (especially daughters) get tested, all first degree relatives should be tested       3.)                     Labs and/or tests ordered include: None today.          Ellen Pitts MD  Gynecologic Oncology  Pager [phone]

## 2018-03-30 NOTE — NURSING NOTE
"Oncology Rooming Note    March 30, 2018 12:00 PM   Jojo Fuentes is a 63 year old female who presents for:    Chief Complaint   Patient presents with     Oncology Clinic Visit     Return visit     Initial Vitals: /79 (BP Location: Right arm, Patient Position: Sitting, Cuff Size: Adult Regular)  Pulse 73  Temp 97.6  F (36.4  C) (Oral)  Ht 1.651 m (5' 5\")  Wt 99.4 kg (219 lb 2.2 oz)  SpO2 98%  BMI 36.47 kg/m2 Estimated body mass index is 36.47 kg/(m^2) as calculated from the following:    Height as of this encounter: 1.651 m (5' 5\").    Weight as of this encounter: 99.4 kg (219 lb 2.2 oz). Body surface area is 2.14 meters squared.  No Pain (0) Comment: Data Unavailable   No LMP recorded. Patient is postmenopausal.  Allergies reviewed: Yes  Medications reviewed: Yes    Medications: Medication refills not needed today.  Pharmacy name entered into [facility]: [id number] [place], [place] - [address] AT [address] [place] [address] & [facility]    Clinical concerns: No new concerns. Provider was notified.    10 minutes for nursing intake (face to face time)     Vane Mitchell LPN            "

## 2018-03-30 NOTE — PROGRESS NOTES
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 16: XL//BSO/OMX/suboptimal tumor debulking by Dr Lopez (operative notes conflicting regarding extent of residual disease).   2.  Received 6 cycles of adjuvant dose dense chemotherapy in [facility] with Dr Licea (dose dense x 3 cycles, then q 3 week for cycles 4-6)  Per review of the notes, has had some issues with pancytopenia, but no major treatment delays   3. 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 Likely pathogenic mutations are clinically treated as pathogenic. Specifically her mutation is called c.1936+1G>A. Harmful mutations in BRIP1 can cause increased risk for ovarian cancer and breast cancer.  Studies have shown that the risk for ovarian cancer is around 9% for individuals with a pathogenic BRIP1 mutation (compared to 1-2% in the general population), but the exact risks for breast cancer are not well defined at this time.  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  This variant is called c.1117G>A (p.G373R).  No other mutations were found in the MLH1 gene.\"        Ms Jojo Fuentes is a 61 year old year old Cymro female who presents for followup regarding advanced ovarian cancer. She is here today with a Uruguayan .      Today, she feels great. She is enjoying life. Her family is doing well. No pain or bloating. No bleeding. Appetite is low, but no changes.   Port is out.   She is being followed closely in [facility] and had a Ca-125 in December which was 20.6          Review of Systems:  Systemic:No weight changes.    Skin : No skin changes or new lesions.   Eye : No changes in vision. " "  Pulmonary: No cough or SOB.   Cardiovascular: No CP or palpitations  Gastrointestinal : No diarrhea, constipation, abdominal pain. Bowel habits normal. Nausea  Genitourinary: No dysuria, urgency or bleeding  Psychiatric: No depression or anxiety  Hematologic : No palpable lymph nodes.   Endocrine : No hot flashes. No heat/cold intolerance.      Neurological: No headaches, some numbness.         Past Medical History:  Chronic pain  Ovarian cancer      Past Surgical History:  KATALINA, BSO, LND       OBGYN history and Health Maintenance:    Last Mamogram: normal  Last Colonoscopy: 2016 normal      Current Medications:   Reviewed in [facility]IC     Allergies: Ascorbate (swelling)         Social History:  Never smoker. Denies alcohol use.  Work: homemaker  Ethnicity identification: Cambodian  Preferred language: Cambodian  Lives at home with: Daughters     Family History:   The patient's family history is notable for no known gyn, colon, breast malignanices although limited knowledge of disease in previous generations     Physical Exam:   /79 (BP Location: Right arm, Patient Position: Sitting, Cuff Size: Adult Regular)  Pulse 73  Temp 97.6  F (36.4  C) (Oral)  Ht 1.651 m (5' 5\")  Wt 99.4 kg (219 lb 2.2 oz)  SpO2 98%  BMI 36.47 kg/m2      General: Alert and oriented, no acute distress.   Psych: Mood stable. Bright affect.   Cardiovascular: RRR, no murmors  Pulmonary: Lungs clear . Normal respiratory effort  GI: Obesity. No distention. No masses. No hernia. Incision is well healed  Lymph: No enlarge lymph nodes in neck or groin  : Evidence of previous genital surgery with absent labia minora and minimal clitoris. Cervix multiparous and present. Cervix smooth except.  Nabothain cysts on cervix stable.   Chest:IV port out     Ca125:   2016: 143  16: 25.3  : 14  2016: 10  2017 (in [facility]): 12  17: 8  17: 9  2017: 20 (in [facility])           Assessment:     Jojo Fuentes is a 61 " year old woman with Advanced ovarian/fallopian adenocarcinoma, stage IIIC suboptimally debulked, completed adjuvant carbo/taxol.          Plan:      1.)   Ovarian/fallopian tube cancer: JOAN based on exam. Ca-125 has risen slightly in December, but she is completely asymptomatic. We did discuss the role of Ca-125 testing (through  today). She has plans to get his checked in [facility] in 2 months. Given that she is asymptomatic and with a normal exam today, I think waiting 2 months would be very reasonable.       -Followup in [facility] as scheduled. If Ca-125 elevated, would recommend CT scan  -We can keep following her here as well. I explained that it is also OK if she wants to centralize all of her care in [facility]. She would like to also have surveillance here. Therefore, will see her back in 6 months.      2.)                     Genetic risk factors were assessed:  BRIP-1 mutation . Pathogenic. Risk for breat and ovarian cancer. Did attempt to explain this again through the interpreted but I am unsure if she understood the significance of this.   -Recommend that kids (especially daughters) get tested, all first degree relatives should be tested       3.)                     Labs and/or tests ordered include: None today.          Ellen Pitts MD  Gynecologic Oncology  Pager [phone]

## 2018-10-05 ENCOUNTER — ONCOLOGY VISIT (OUTPATIENT)
Dept: ONCOLOGY | Facility: CLINIC | Age: 63
End: 2018-10-05
Attending: OBSTETRICS & GYNECOLOGY
Payer: COMMERCIAL

## 2018-10-05 ENCOUNTER — TELEPHONE (OUTPATIENT)
Dept: ONCOLOGY | Facility: CLINIC | Age: 63
End: 2018-10-05

## 2018-10-05 VITALS
HEART RATE: 75 BPM | TEMPERATURE: 97.9 F | OXYGEN SATURATION: 100 % | SYSTOLIC BLOOD PRESSURE: 111 MMHG | HEIGHT: 65 IN | DIASTOLIC BLOOD PRESSURE: 72 MMHG | RESPIRATION RATE: 14 BRPM | WEIGHT: 205.7 LBS | BODY MASS INDEX: 34.27 KG/M2

## 2018-10-05 DIAGNOSIS — C57.00 FALLOPIAN TUBE CANCER, CARCINOMA, UNSPECIFIED LATERALITY (H): Primary | ICD-10-CM

## 2018-10-05 PROCEDURE — 99213 OFFICE O/P EST LOW 20 MIN: CPT | Mod: ZP | Performed by: OBSTETRICS & GYNECOLOGY

## 2018-10-05 PROCEDURE — G0463 HOSPITAL OUTPT CLINIC VISIT: HCPCS | Mod: ZF

## 2018-10-05 RX ORDER — ONDANSETRON 4 MG/1
TABLET, FILM COATED ORAL
COMMUNITY
Start: 2018-09-16

## 2018-10-05 RX ORDER — PROCHLORPERAZINE MALEATE 10 MG
TABLET ORAL
Refills: 2 | COMMUNITY
Start: 2018-09-07 | End: 2020-06-29

## 2018-10-05 ASSESSMENT — PAIN SCALES - GENERAL: PAINLEVEL: NO PAIN (0)

## 2018-10-05 NOTE — NURSING NOTE
"Oncology Rooming Note    October 5, 2018 12:00 PM   Jojo Fuentes is a 63 year old female who presents for:    Chief Complaint   Patient presents with     Oncology Clinic Visit     [facility] RETURN- OVARIAN MASS     Initial Vitals: /72 (BP Location: Right arm, Patient Position: Chair, Cuff Size: Adult Large)  Pulse 75  Temp 97.9  F (36.6  C) (Oral)  Resp 14  Ht 1.651 m (5' 5\")  Wt 93.3 kg (205 lb 11.2 oz)  SpO2 100%  BMI 34.23 kg/m2 Estimated body mass index is 34.23 kg/(m^2) as calculated from the following:    Height as of this encounter: 1.651 m (5' 5\").    Weight as of this encounter: 93.3 kg (205 lb 11.2 oz). Body surface area is 2.07 meters squared.  No Pain (0) Comment: Data Unavailable   No LMP recorded. Patient is postmenopausal.  Allergies reviewed: Yes  Medications reviewed: Yes    Medications: Medication refills not needed today.  Pharmacy name entered into [employer]: [id number] - [place], [place] - [address] AT [address] [place] [address] & [facility]    Clinical concerns: No new concerns. Hong was notified.    10 minutes for nursing intake (face to face time)     Jeff Luna LPN            "

## 2018-10-05 NOTE — LETTER
Cancer Risk Management  Program Locations    [facility] Cancer [facility] Cancer [facility]  Mailing Address  Cancer Risk Management Program  [facility]  [address]  [place] [address]    New patient appointments  [phone]  October 5, 2018    Jojo Fuentes  [address]    [place] [address]      Dear Jojo,    It was a pleasure speaking with you on the phone today. Here is some information on how you can get your children to see a genetic counselor for genetic testing. This could done be at [facility] or at [facility] in [place]:    1. Cancer Risk Management Program - [facility]: [phone]  Locations:    [place] [facility] Cancer [facility] - [facility] - [facility] [place] [facility] (telemedicine)  2. [facility] in [place] - [phone]    It would be helpful for your children to have a copy of your genetic test results when they go in for genetic counseling. I have enclosed a copy for you with this letter.     In brief, your genetic testing showed a mutation in the BRIP1 gene. Specifically, the mutation is called c.1936+1G>A.  Your testing was ordered through a laboratory called [facility] (accession # [id number]). Mutations in BRIP1 have been associated with an increased risk for ovarian cancer and breast cancer.    Please let me know if you have any further questions, or if you need further assistance.    Lisa Oliver MS, [facility]  Licensed Genetic Counselor  P. [phone]  F. [phone]

## 2018-10-05 NOTE — PROGRESS NOTES
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 16: XL//BSO/OMX/suboptimal tumor debulking by Dr Lopez (operative notes conflicting regarding extent of residual disease).   2.  Received 6 cycles of adjuvant dose dense chemotherapy in [facility] with Dr Licea (dose dense x 3 cycles, then q 3 week for cycles 4-6)  Per review of the notes, has had some issues with pancytopenia, but no major treatment delays   3. 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 Likely pathogenic mutations are clinically treated as pathogenic. Specifically her mutation is called c.1936+1G>A. Harmful mutations in BRIP1 can cause increased risk for ovarian cancer and breast cancer.  Studies have shown that the risk for ovarian cancer is around 9% for individuals with a pathogenic BRIP1 mutation (compared to 1-2% in the general population), but the exact risks for breast cancer are not well defined at this time.  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  This variant is called c.1117G>A (p.G373R).  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 No other metastatic disease    Ms Jojo Fuentes is a 61 year old year old [place] citizen female who presents for followup regarding advanced ovarian/fallopian tube cancer. She is here today with her daughter and a Sammarinese " ".      I have reviewed notes from care everywhere. It seems she has gotten 4 cycles of carbo/taxol and has struggled with low counts, fatigue, neuropathy. She reports that there is a CT scan planned soon to assess response. I cannot see Ca-125 levels.     Today she feels very tired. She has numbness/tingling in her hands and feet. She has leg pain at night. She is eating well. She is still ambulating OK. Mood has been OK.        Review of Systems:  Systemic:No weight changes.    Skin : No skin changes or new lesions.   Eye : No changes in vision.   Pulmonary: No cough or SOB.   Cardiovascular: No CP or palpitations  Gastrointestinal : No diarrhea, constipation, abdominal pain. Bowel habits normal. Nausea w chemo  Genitourinary: No dysuria, urgency or bleeding  Psychiatric: No depression or anxiety  Hematologic : No palpable lymph nodes.   Endocrine : No hot flashes. No heat/cold intolerance.  +neuropathy    Neurological: No headaches, some numbness.         Past Medical History:  Chronic pain  Ovarian cancer      Past Surgical History:  KATALINA, BSO, LND       OBGYN history and Health Maintenance:    Last Mamogram:2015 normal  Last Colonoscopy: 2016 normal      Current Medications:   Reviewed in EPIC     Allergies: Ascorbate (swelling)         Social History:  Never smoker. Denies alcohol use.  Work: homemaker  Ethnicity identification: Singaporean  Preferred language: Singaporean  Lives at home with: Daughters     Family History:   The patient's family history is notable for no known gyn, colon, breast malignanices although limited knowledge of disease in previous generations     Physical Exam:   /72 (BP Location: Right arm, Patient Position: Chair, Cuff Size: Adult Large)  Pulse 75  Temp 97.9  F (36.6  C) (Oral)  Resp 14  Ht 1.651 m (5' 5\")  Wt 93.3 kg (205 lb 11.2 oz)  SpO2 100%  BMI 34.23 kg/m2      General: Alert and oriented, no acute distress. Tired appearing  Cardiovascular: RRR, no " murmors  Pulmonary: Lungs clear . Normal respiratory effort  GI: Obesity. No distention. No masses. No hernia. Incision is well healed  Lymph: No enlarge lymph nodes in neck or groin  :Def  Chest:IV port in     Ca125:   6/2016: 143  8/12/16: 25.3  9/16: 14  11/11/2016: 10  2/27/2017 (in [facility]): 12  4/14/17: 8  7/20/17: 9  12/2017: 20 (in [facility])    Remainder of Ca125 not available       Assessment:     Jojo Fuentes is a 63 year old woman with Advanced ovarian/fallopian adenocarcinoma, stage IIIC suboptimally debulked, completed adjuvant carbo/taxol.  Now with recurrence. I have reviewed care everywhere notes, CT scans        Plan:      1.)   Ovarian/fallopian tube cancer: recurrence based on elevated Ca-125, CT scan and biopsy. Care in [facility]. Is not tolerating palliative chemo (carbo/taxol) well although per written report, her ca-125 is decreasing. Given her deconditioning as well as her multifocal disease, I am not recommending further debulking surgery. I feel systemic therapy is what she needs. Given her poor tolerance of dual agent, I would strongly consider either single agent carbo (as this is the most effective of the 2) or switching to maintenance PARP. My preference would be 2 more cycles of single agent carbo followed by a PARP-I. Explained to her and her daughter that at this point this is a chronic disease and we will need to balance treatment with quality of life and ultimately the treatments may stop working. Given her BRIP mutation, I am hopeful that she will continue to be sensitive to platinum and potentially a PARP as well.       - Recommendations communicated via phone to RN in [facility], will send note  -Followup with me PRN given her care is coordinated in [facility]     2.)    Genetic risk factors were assessed:  BRIP-1 mutation . Pathogenic. Risk for breast and ovarian cancer. Discussed this again    -Recommend that kids (especially daughters) get tested, all first degree relatives  should be tested. Will also reach out to our genetic counselors.              Ellen Pitts MD  Gynecologic Oncology  Pager [phone]

## 2018-10-05 NOTE — MR AVS SNAPSHOT
"              After Visit Summary   10/5/2018    Jojo Fuentes    MRN: [id number]           Patient Information     Date Of Birth          1955        Visit Information        Provider Department      10/5/2018 11:45 AM Ellen Pitts MD; [employer] [facility]        Today's Diagnoses     Fallopian tube cancer, carcinoma, unspecified laterality (H)    -  1       Follow-ups after your visit        Who to contact     If you have questions or need follow up information about today's clinic visit or your schedule please contact [facility] directly at [phone].  Normal or non-critical lab and imaging results will be communicated to you by [employer], letter or phone within 4 business days after the clinic has received the results. If you do not hear from us within 7 days, please contact the clinic through [employer] or phone. If you have a critical or abnormal lab result, we will notify you by phone as soon as possible.  Submit refill requests through [employer] or call your pharmacy and they will forward the refill request to us. Please allow 3 business days for your refill to be completed.          Additional Information About Your Visit        MyChart Information     [employer] lets you send messages to your doctor, view your test results, renew your prescriptions, schedule appointments and more. To sign up, go to [email or web address] . Click on \"Log in\" on the left side of the screen, which will take you to the Welcome page. Then click on \"Sign up Now\" on the right side of the page.     You will be asked to enter the access code listed below, as well as some personal information. Please follow the directions to create your username and password.     Your access code is: [email or web address]  Expires: 2018  6:30 AM     Your access code will  in 90 days. If you need help or a new code, please call your [place] clinic or [phone].        Care EveryWhere " "ID     This is your Care EveryWhere ID. This could be used by other organizations to access your [place] medical records  [id number]        Your Vitals Were     Pulse Temperature Respirations Height Pulse Oximetry BMI (Body Mass Index)    75 97.9  F (36.6  C) (Oral) 14 1.651 m (5' 5\") 100% 34.23 kg/m2       Blood Pressure from Last 3 Encounters:   10/05/18 111/72   03/30/18 130/79   07/20/17 124/79    Weight from Last 3 Encounters:   10/05/18 93.3 kg (205 lb 11.2 oz)   03/30/18 99.4 kg (219 lb 2.2 oz)   07/20/17 94.3 kg (207 lb 14.3 oz)              Today, you had the following     No orders found for display       Primary Care Provider Office Phone # Fax #    Aimee Huang [phone]       [address] [address]        Equal Access to Services     [facility]: Hadii aad ku hadasho Soomaali, waaxda luqadaha, qaybta kaalmada adeegyada, waxay idiin hayadri rivera . So [facility] [phone].    ATENCIÓN: Si habla español, tiene a mandujano disposición servicios gratuitos de asistencia lingüística. [facility] [phone].    We comply with applicable federal civil rights laws and [place] laws. We do not discriminate on the basis of race, color, national origin, age, disability, sex, sexual orientation, or gender identity.            Thank you!     Thank you for choosing [facility]  for your care. Our goal is always to provide you with excellent care. Hearing back from our patients is one way we can continue to improve our services. Please take a few minutes to complete the written survey that you may receive in the mail after your visit with us. Thank you!             Your Updated Medication List - Protect others around you: Learn how to safely use, store and throw away your medicines at www.disposemymeds.org.          This list is accurate as of 10/5/18  1:25 PM.  Always use your most recent med list.                   Brand Name " Dispense Instructions for use Diagnosis    acetaminophen 325 MG tablet    TYLENOL    100 tablet    Take 2 tablets (650 mg) by mouth every 4 hours as needed for mild pain or fever    Ovarian mass, S/P laparotomy       calcium carbonate 600 mg-vitamin D 400 units 600-400 MG-UNIT per tablet    CALTRATE    60 tablet    Take 1 tablet by mouth 2 times daily    Fallopian tube cancer, carcinoma, unspecified laterality (H)       cetirizine 10 MG tablet    zyrTEC     Take 10 mg by mouth        enoxaparin 40 MG/0.4ML injection    LOVENOX    25 Syringe    Inject 0.4 mLs (40 mg) Subcutaneous every 24 hours    Ovarian mass, S/P laparotomy       fluticasone 50 MCG/ACT spray    FLONASE     2 sprays        GABAPENTIN PO      Take 100 mg by mouth daily Reported on 4/14/2017        ibuprofen 600 MG tablet    ADVIL/MOTRIN    100 tablet    Take 1 tablet (600 mg) by mouth every 6 hours as needed for other (inflammatory pain)    Ovarian mass, S/P laparotomy       lidocaine-prilocaine cream    EMLA     Apply 1 Application topically        niraparib 100 MG capsule    ZEJULA     Take 100 mg by mouth        ondansetron 4 MG tablet    ZOFRAN          oxyCODONE IR 5 MG tablet    ROXICODONE    40 tablet    Take 1-2 tablets (5-10 mg) by mouth every 4 hours as needed for moderate to severe pain    Ovarian mass, S/P laparotomy       polyethylene glycol Packet    MIRALAX/GLYCOLAX    30 packet    Take 17 g by mouth daily    Ovarian mass, S/P laparotomy       PRILOSEC PO      Take 40 mg by mouth every morning Reported on 4/14/2017        prochlorperazine 10 MG tablet    COMPAZINE     TK 1 T PO Q 6 H PRF NAUSEA. START THIS AS SOON AS YOU FEEL NAUSEATED        senna-docusate 8.6-50 MG per tablet    SENOKOT-S;PERICOLACE    100 tablet    Take 1-2 tablets by mouth 2 times daily    Ovarian mass, S/P laparotomy       simethicone 80 MG chewable tablet    MYLICON    180 tablet    Take 1 tablet (80 mg) by mouth 4 times daily as needed for cramping (gas)     Ovarian mass, S/P laparotomy       VITAMIN D (CHOLECALCIFEROL) PO      Take 1,000 Units by mouth daily Reported on 4/14/2017

## 2018-10-05 NOTE — Clinical Note
Arie Gonzalez. Could you take a look at this patient? She is a BRIP1 mutation fallopian tube cancer patient. She speaks Hungarian only. She has recurrent disease and her care is in [facility]. I talked to her daughter again today about the importance that her, her 3 sisters and brother get tested. Could you reach out to the family to potentially get them in for testing? Its not urgent, but I don't want it to slip through the cracks. She cant remember where she put the paperwork/phone number. Thanks, Ellen

## 2018-10-05 NOTE — TELEPHONE ENCOUNTER
10/5/2018    I spoke to Jojo today, with the assistance of a [place]ian  by phone, per request of Dr. Ellen Pitts. Jojo would like to have her children come in for genetic testing.    Jojo did not have a way to write down the information on how to get her children scheduled, so I will write a letter for her, including scheduling information, my contact information, and another copy of her genetic test results. I will have this translated into Bolivian and mailed to her.    Jojo had no further questions today.    Lisa Oliver MS, [facility]  Licensed Genetic Counselor  P. [phone]  F. [phone]

## 2018-10-05 NOTE — LETTER
"10/5/2018       RE: Jojo Fuentes  [address]  [address]  [place] [address]     Dear Colleague,    Thank you for referring your patient, Jojo Fuentes, to the [facility]. Please see a copy of my visit note below.    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 16: XL//BSO/OMX/suboptimal tumor debulking by Dr Lopez (operative notes conflicting regarding extent of residual disease).   2.  Received 6 cycles of adjuvant dose dense chemotherapy in [facility] with Dr Licea (dose dense x 3 cycles, then q 3 week for cycles 4-6)  Per review of the notes, has had some issues with pancytopenia, but no major treatment delays   3. 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 Likely pathogenic mutations are clinically treated as pathogenic. Specifically her mutation is called c.1936+1G>A. Harmful mutations in BRIP1 can cause increased risk for ovarian cancer and breast cancer.  Studies have shown that the risk for ovarian cancer is around 9% for individuals with a pathogenic BRIP1 mutation (compared to 1-2% in the general population), but the exact risks for breast cancer are not well defined at this time.  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  This variant is called c.1117G>A (p.G373R).  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 No other metastatic disease    Ms Jojo Fuentes is a 61 year old year old [place]ian female who presents for followup regarding advanced ovarian/fallopian tube cancer. She is here today with her daughter and a Brazilian .      I have reviewed notes from care everywhere. It seems she has gotten 4 cycles of carbo/taxol and has struggled with low counts, fatigue, neuropathy. She reports that there is a CT scan planned soon to assess response. I cannot see Ca-125 levels.     Today she feels very tired. She has numbness/tingling in her hands and feet. She has leg pain at night. She is eating well. She is still ambulating OK. Mood has been OK.        Review of Systems:  Systemic:No weight changes.    Skin : No skin changes or new lesions.   Eye : No changes in vision.   Pulmonary: No cough or SOB.   Cardiovascular: No CP or palpitations  Gastrointestinal : No diarrhea, constipation, abdominal pain. Bowel habits normal. Nausea w chemo  Genitourinary: No dysuria, urgency or bleeding  Psychiatric: No depression or anxiety  Hematologic : No palpable lymph nodes.   Endocrine : No hot flashes. No heat/cold intolerance.   +neuropathy    Neurological: No headaches, some numbness.         Past Medical History:  Chronic pain  Ovarian cancer      Past Surgical History:  KATALINA, BSO, LND       OBGYN history and Health Maintenance:    Last Mamogram: normal  Last Colonoscopy: 2016 normal      Current Medications:   Reviewed in EPIC     Allergies: Ascorbate (swelling)         Social History:  Never smoker. Denies alcohol use.  Work: homemaker  Ethnicity identification: Ghanaian  Preferred language: Ghanaian  Lives at home with: Daughters     Family History:   The patient's family history is notable for no known gyn, colon, breast malignanices although limited knowledge of disease in previous generations     Physical Exam:   /72 (BP Location: Right arm, Patient Position:  "Chair, Cuff Size: Adult Large)  Pulse 75  Temp 97.9  F (36.6  C) (Oral)  Resp 14  Ht 1.651 m (5' 5\")  Wt 93.3 kg (205 lb 11.2 oz)  SpO2 100%  BMI 34.23 kg/m2      General: Alert and oriented, no acute distress. Tired appearing  Cardiovascular: RRR, no murmors  Pulmonary: Lungs clear . Normal respiratory effort  GI: Obesity. No distention. No masses. No hernia. Incision is well healed  Lymph: No enlarge lymph nodes in neck or groin  :Def  Chest:IV port in     Ca125:   6/2016: 143  8/12/16: 25.3  9/16: 14  11/11/2016: 10  2/27/2017 (in [facility]): 12  4/14/17: 8  7/20/17: 9  12/2017: 20 (in [facility])    Remainder of Ca125 not available       Assessment:     Jojo Fuentes is a 63 year old woman with Advanced ovarian/fallopian adenocarcinoma, stage IIIC suboptimally debulked, completed adjuvant carbo/taxol.  Now with recurrence. I have reviewed care everywhere notes, CT scans        Plan:      1.)   Ovarian/fallopian tube cancer: recurrence based on elevated Ca-125, CT scan and biopsy. Care in [facility]. Is not tolerating palliative chemo (carbo/taxol) well although per written report, her ca-125 is decreasing. Given her deconditioning as well as her multifocal disease, I am not recommending further debulking surgery. I feel systemic therapy is what she needs. Given her poor tolerance of dual agent, I would strongly consider either single agent carbo (as this is the most effective of the 2) or switching to maintenance PARP. My preference would be 2 more cycles of single agent carbo followed by a PARP-I. Explained to her and her daughter that at this point this is a chronic disease and we will need to balance treatment with quality of life and ultimately the treatments may stop working. Given her BRIP mutation, I am hopeful that she will continue to be sensitive to platinum and potentially a PARP as well.       - Recommendations communicated via phone to RN in [facility], will send note  -Followup with me PRN " given her care is coordinated in [place] [address].)    Genetic risk factors were assessed:  BRIP-1 mutation . Pathogenic. Risk for breast and ovarian cancer. Discussed this again    -Recommend that kids (especially daughters) get tested, all first degree relatives should be tested. Will also reach out to our genetic counselors.              Ellen Pitts MD  Gynecologic Oncology  Pager [phone]

## 2020-06-25 ENCOUNTER — TELEPHONE (OUTPATIENT)
Dept: ONCOLOGY | Facility: CLINIC | Age: 65
End: 2020-06-25

## 2020-06-25 NOTE — TELEPHONE ENCOUNTER
Nurse Luisa from Dr. Sabillon's office at [facility] in [place] was calling to facilitate setting up an appointment with Dr. Carpio.    Luisa can be reached at [phone], extension [phone].    Routing to Dr. Carpio for clarification.    Cosme Marcus, BSN, RN, OCN  Oncology Care Coordinator  [facility]

## 2020-06-25 NOTE — TELEPHONE ENCOUNTER
Per Dr. Carpio, patient can be added to 6/29 clinic.     Message sent to [facility] schedulers and to RNCC Alicia Marcus, HOMEN, RN, OCN  Oncology Care Coordinator  [facility]

## 2020-06-26 NOTE — TELEPHONE ENCOUNTER
Patient is scheduled on 6/29/20 at 10am for telephone visit with Dr. Carpio.    Writer contacted patient to review plan, but spoke with daughter due to patient does not speak English.    Writer reviewed with patient's daughter appointment date/time.  Writer reviewed with patient's daughter that Dr. Carpio is a Gynecologic Oncologist and that Dr. Guerra recommended that her mother see Dr. Carpio. Writer reviewed with patient's daughter that her mother's visit on 6/29/20 will be a virtual visit.    Daughter confirmed that the best phone number to call her mother is [phone].  Daughter is asking for Zeuss  to be scheduled for this visit.  Writer reviewed that our clinic will help facilitate scheduling for .    Writer informed Aida El in scheduling department that this patient will need a Zeuss .    Writer contacted DEVORAH Moran, at Dr. Sabillon's office to provide an update.    Gina Bartlett RN, BSN, OCN on 6/26/2020 at 11:42 AM

## 2020-06-29 ENCOUNTER — VIRTUAL VISIT (OUTPATIENT)
Dept: ONCOLOGY | Facility: CLINIC | Age: 65
End: 2020-06-29
Attending: OBSTETRICS & GYNECOLOGY
Payer: COMMERCIAL

## 2020-06-29 DIAGNOSIS — C56.9 OVARIAN CANCER, UNSPECIFIED LATERALITY (H): Primary | ICD-10-CM

## 2020-06-29 PROCEDURE — G0463 HOSPITAL OUTPT CLINIC VISIT: HCPCS

## 2020-06-29 PROCEDURE — 40001009 ZZH VIDEO/TELEPHONE VISIT; NO CHARGE: Mod: 95

## 2020-06-29 PROCEDURE — 99215 OFFICE O/P EST HI 40 MIN: CPT | Mod: 95 | Performed by: OBSTETRICS & GYNECOLOGY

## 2020-06-29 NOTE — LETTER
"    2020         RE: Jojo Fuentes  [address]  [address]  [place] [address]        Dear Colleague,    Thank you for referring your patient, Jojo Fuentes, to the [facility]. Please see a copy of my visit note below.    Jojo Fuentes is a 65 year old female who is being evaluated via a billable telephone visit.      The patient has been notified of following:     \"This telephone visit will be conducted via a call between you and your physician/provider. We have found that certain health care needs can be provided without the need for a physical exam.  This service lets us provide the care you need with a short phone conversation.  If a prescription is necessary we can send it directly to your pharmacy.  If lab work is needed we can place an order for that and you can then stop by our lab to have the test done at a later time.    Telephone visits are billed at different rates depending on your insurance coverage. During this emergency period, for some insurers they may be billed the same as an in-person visit.  Please reach out to your insurance provider with any questions.    If during the course of the call the physician/provider feels a telephone visit is not appropriate, you will not be charged for this service.\"    Patient has given verbal consent for Telephone visit?  Yes    What phone number would you like to be contacted at? [phone]    How would you like to obtain your AVS? Mail a copy     Consuelo Cano CMA            Consult Notes on Referred Patient            RE: Jojo Fuentes  : 1955  EVIN: 2020       HPI:  Jojo Fuentes is 65 year old with recurrent ovarian cancer, here for second opinion regarding best next treatment option.  A 9+  was utilized for the entire visit.  She reports at the current time she is feeling generally well and denies significant symptoms.  She denies any abdominal/pelvic pain, changes in her bowel/bladder " habits, CP/SOB.  She notes she has been out of her niraparib and thus has not been taking it for the past month.    Cancer Course:    1/17/16:  CT A/P:  1.  There are no renal stones or hydronephrosis within either kidney. 2.  Both adnexa appear mildly enlarged with a small amount of free fluid within the cul-de-sac. Although this is nonspecific, given the enlargement, pelvic ultrasound may be helpful for further evaluation.     4/27/16:  Consult with Dr Tapia, surgery recommended, pt declined    5/18/16:  CT A/P:  1. Known bilateral ovarian masses compatible with patient's known ovarian cancer. 2. Low attenuation lesion right upper quadrant, right lobe of the liver  and spleen concerning for metastatic disease, confirmation with PET CT could be performed as clinically indicated. 3. Small amount of pelvic ascites.    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  Thickened endometrium, presumably from the stated endometrial carcinoma.  No convincing metastatic disease. 2.  Mildly complex splenic cyst.      11/11/16:  Consult with Dr Pitts who recommended no completion surgery and to enter surveillance    1/24/17:  CT A/P:  1.  No renal or ureteral stone. No hydronephrosis or hydroureter. 2.  Normal appendix. 3.  Small lymph nodes in the mesentery, nonspecific. 4.  Osteopenia and degenerative changes of the spine and hips.    3/31/18:  CT A/P:  No evidence of hydronephrosis or nephrolithiasis. Changes around the liver and  related to the spleen most concerning for metastatic disease given the patient's history.  = 29.4    4/13/18:  CT Chest: No evidence for metastatic disease to the chest.  Lungs are essentially clear.  Heterogeneous masses in the spleen and low-density along the surface of the liver, as above, incompletely imaged on this study but similar appearance to previous imaging, likely corresponding with metastatic disease.    5/14/18:  CT C/A/P:  Metastatic disease within the spleen, increased from previous study. A tracheal lesion seen anteriorly along the liver edge which may represent mesenteric implant or possibly a subcapsular collection.  This does appears mildly increased in size from previous study. Bowel loops are unremarkable.  No suspicious adenopathy noted at this time.  = 45.2    5/31/18:  CT biopsy of spleen   Pathology:  recurrent high grade serous ovarian carcinoma    6/26/18-10/3/18:  Comnpleted 5 cycles of carboplatin and paclitaxel q 3 weeks, cycle 5 single agent carboplatin, stopped due to myelosuppression and pt intolerance,  = 69.5-->19.8    9/6/18:  CT C/A/P:  1. Interval decrease in size of the splenic lesions and peritoneal implant along the anterior hepatic region. 2. No other evidence of metastatic disease.    10/24/18:  PET/CT:  1. Single focus of abnormal FDG uptake noted in the right mastoid air cells without clear CT correlate, although this is only visualized on the cranial most slice.  Dedicated CT could be considered for further evaluation. 2. No abnormal FDG uptake in the chest, abdomen, or pelvis. 3. Grossly unchanged appearance of splenic lesions with no FDG uptake. Previously seen peritoneal nodule along the anterior surface of the liver is not clearly visualized.    12/13/18:  Consult at [facility] with Dr Lopez, Recommended debulking, but pt declined    12/14/18:  CT C/A/P:  1. Sequential decrease size of presumed splenic metastases since 05/14/2018, with resolution of  perihepatic metastasis since that examination. 2. Mildly abnormal appearance lower uterus/cervix, suggest further correlation with pelvic ultrasound. 3. No evidence of thoracic metastatic disease.    12/19/18: Discussion of niraparib but pt declined in favor of surveillance    2/19/19:  PET/CT:  1. Focus of asymmetric radiotracer uptake along the anterior liver adjacent to the gallbladder within segment 4B.  There is no discrete CT abnormality in the liver in this region.  It is uncertain if this is artifact, true abnormal radiotracer uptake in the liver to suggest early metastatic disease, or  misregistration from an adjacent peritoneal focus of activity.  This is seen within the vicinity of a remotely seen soft tissue perihepatic nodule on a CT from 05/14/2018.  Consider follow-up ultrasound for further evaluation. Attention on subsequent examination is recommended. 2. Mild asymmetric radiotracer uptake along the posteromedial right 10th and 11th ribs adjacent subpleural fat without focal destructive osseous lesion, pleural thickening, or soft tissue nodularity.  This is favored to be artifact and associated brown fat activity.    4/30/19:  PET/CT:  1. Two foci of peritoneal metastatic disease, one adjacent to the left hepatic lobe of the liver, the other adjacent to the spleen. 2. No other evidence of malignancy within the neck chest abdomen or pelvis. Additional nonemergent findings as above.    6/18/19:  PET/CT:  1. Slight interval increase in size of 2 metabolically active peritoneal metastases adjacent to the left hepatic lobe and spleen with stable metabolic activity near the spleen and slightly decreased metabolic activity near the liver. 2. No findings to suggest other metastatic disease within the neck, chest, abdomen or pelvis.    7/18/19:  CT C/A/P:  1. No CT evidence of pulmonary embolism. 2. Progression in metastatic disease with interval increase in size of splenic and perisplenic lesions and  bilateral anterior peritoneal implants.    8/29/19:   = 38.6    9/6/19:  PET/CT:  1. Enlargement of cystic masses anterior to the liver as well as adjacent to the spleen and within the spleen itself as described above.  While qualitative assessment demonstrates overall decreased tracer uptake from prior, given the patient's known serous neoplasm, interval enlargement would be highly concerning for disease progression.  No new hypermetabolic foci noted however.    12/2/19:  CT A/P:  1. No acute changes identified in the abdomen or pelvis. 2. Interval progression of the splenic metastatic disease as well as the perisplenic peritoneal implant.  These may be the causes of the patient left flank pain.  The peritoneal implant along the anterior aspect of the liver have not appreciably changed.    12/23/19:  PET/CT:  1. Decreasing size extrahepatic mass, however with increased FDG uptake.  The appearance is consistent with metastatic disease. 2. Low-density lesions are seen within and adjacent to the spleen, increasing in size.  Although these do not demonstrate increased FDG uptake, metastatic  disease suspected. 3. No other abnormal uptake is identified.    10/30/19- 3/9/20:  6 cycles of carboplatin (AUC3) and gemcitabine (75% dose reduction),  = 72.7--> 14.2    3/26/20:  PET/CT:  1. Significantly decreased FDG uptake in the right upper quadrant adjacent to the liver, low-attenuation mass previously noted is no longer visualized. Findings consistent with response to treatment. 2. Decreased size of low-attenuation splenic lesion without associated FDG uptake. 3. No evidence of malignancy in the chest.    4/13/20:  Began niraparib, however discontinued due to COVID concerns per pt    6/24/20:  PET/CT:  1. Progression of metastatic disease in abdomen.  Increased thickness and FDG uptake in the right anterior abdominal peritoneal deposit. 2. Indeterminate hypermetabolic lesion along the anterior splenic pole may  represent a new metastatic deposit.      Review of Systems:  Systemic           no weight changes; no fever; no chills; no night sweats; no appetite changes  Skin           no rashes, or lesions  Eye           no irritation; no changes in vision  Al-Laryngeal           no dysphagia; no hoarseness   Pulmonary    no cough; no shortness of breath  Cardiovascular    no chest pain; no palpitations  Gastrointestinal    no diarrhea; no constipation; no abdominal pain; no changes in bowel  habits; no blood in stool  Genitourinary   no urinary frequency; no urinary urgency; no dysuria; no pain; no abnormal vaginal discharge; no abnormal vaginal bleeding  Breast    no breast discharge; no breast changes; no breast pain  Musculoskeletal    no myalgias; no arthralgias; no back pain  Psychiatric           no depressed mood; no anxiety    Hematologic            no tender lymph nodes; no noticeable swellings or lumps   Endocrine    no hot flashes; no heat/cold intolerance         Neurological   no tremor; no numbness and tingling; no headaches; no difficulty sleeping    Obstetrics and Gynecology History:  ,  x 6   No HRT use      Past Medical History:  Past Medical History:   Diagnosis Date     Chronic pain      Monoallelic mutation of BRIP1 gene 2017    tested at [facility] 2017 BRIP1 c.1936+1G>A     Ovarian cancer (H)        Past Surgical History:  Past Surgical History:   Procedure Laterality Date     HYSTERECTOMY TOTAL ABDOMINAL, BILATERAL SALPINGO-OOPHORECTOMY, NODE DISSECTION, COMBINED Bilateral 2016    Procedure: COMBINED HYSTERECTOMY TOTAL ABDOMINAL, SALPINGO-OOPHORECTOMY, NODE DISSECTION;  Surgeon: Geno Tapia MD;  Location:  OR       Health Maintenance:  Last Pap Smear: 16              Result: Negative, HPV negative  She has not had a history of abnormal Pap smears.    Last Mammogram: 17              Result: normal      She has not had a history of abnormal  mammograms.    Last Colonoscopy: Never      Current Medications:   has a current medication list which includes the following prescription(s): acetaminophen, calcium carbonate 600 mg-vitamin d 400 units, cetirizine, fluticasone, gabapentin, ibuprofen, lidocaine-prilocaine, niraparib, ondansetron, and cholecalciferol.     Allergies:   Ascorbate and No clinical screening - see comments      Social History:  Social History     Socioeconomic History     Marital status:      Spouse name: Not on file     Number of children: Not on file     Years of education: Not on file     Highest education level: Not on file   Occupational History     Not on file   Social Needs     Financial resource strain: Not on file     Food insecurity     Worry: Not on file     Inability: Not on file     Transportation needs     Medical: Not on file     Non-medical: Not on file   Tobacco Use     Smoking status: Never Smoker     Smokeless tobacco: Never Used   Substance and Sexual Activity     Alcohol use: No     Alcohol/week: 0.0 standard drinks     Drug use: No     Sexual activity: Not on file   Lifestyle     Physical activity     Days per week: Not on file     Minutes per session: Not on file     Stress: Not on file   Relationships     Social connections     Talks on phone: Not on file     Gets together: Not on file     Attends Anabaptist service: Not on file     Active member of club or organization: Not on file     Attends meetings of clubs or organizations: Not on file     Relationship status: Not on file     Intimate partner violence     Fear of current or ex partner: Not on file     Emotionally abused: Not on file     Physically abused: Not on file     Forced sexual activity: Not on file   Other Topics Concern     Not on file   Social History Narrative     Not on file       Lives with daughter, feels safe at home.  Does not work.  Does not have an advanced directive on file and would like her daughter, Pauly to be her POA.  Would  like full resuscitation if reversible cause is identified, however would not like to be kept on life sustaining measures long-term.     Family History:   The patient's family history is significant for.  History reviewed. No pertinent family history.        Assessment:    Jojo Fuentes is a 65 year old woman with a diagnosis of recurrent ovarian cancer.     A total of 60 minutes was spent with the patient, >50% of which were spent in counseling the patient and/or treatment planning.      Plan:     1.)    Recurrent ovarian cancer. Reviewed patients treatment course to date.  She reports she is overall feeling very well right now without significant side effects.  We reviewed her most recent CT showing increasing disease burden, however her  remains normal at this time.  We discussed that surgical debulking in the setting of platinum resistant disease is usually under taken for palliative measures only and given she is asymptomatic at this time, I would not recommend surgical debulking.  We discussed the option of enrolling on a clinical trial of which we have several options she may be eligible for, however she is not interested in pursuing this as she would not want to come to the [place] for treatments at this time.  We then discussed options of further traditional chemotherapy vs niraparib therapy.  Given she only took niraparib for 1 month prior to stopping, I do not feel she has given this an adequate length to assess its efficacy.  She is inclined towards continuing niraparib therapy and I think for this reason, this is very reasonable.  She would like to continue to have this managed with Dr Lieberman and I will discuss this with Dr Lieberman.  We would be happy to see her back in the future if she were to ever become interested in pursuing a clinical trial.     2.) Genetic risk factors were assessed and the patient is POSITIVE for a BRIP1 likely pathogenic mutation. Likely pathogenic mutations are  clinically treated as pathogenic. Specifically her mutation is called c.1936+1G>A as well as a variant of uncertain significance (VUS) in the MLH1 gene.  This variant is called c.1117G>A (p.G373R).    3.) Labs and/or tests ordered include:  None.     4.) Health maintenance issues addressed today include pt no longer needs screening exams given her platinum sensitive disease.        Thank you for allowing us to participate in the care of your patient.         Sincerely,    Ariadna Carpio MD  Gynecologic Oncology  [facility]       CC           Again, thank you for allowing me to participate in the care of your patient.        Sincerely,        Rodney Carpio MD

## 2020-06-29 NOTE — PROGRESS NOTES
"Jojo Fuentes is a 65 year old female who is being evaluated via a billable telephone visit.      The patient has been notified of following:     \"This telephone visit will be conducted via a call between you and your physician/provider. We have found that certain health care needs can be provided without the need for a physical exam.  This service lets us provide the care you need with a short phone conversation.  If a prescription is necessary we can send it directly to your pharmacy.  If lab work is needed we can place an order for that and you can then stop by our lab to have the test done at a later time.    Telephone visits are billed at different rates depending on your insurance coverage. During this emergency period, for some insurers they may be billed the same as an in-person visit.  Please reach out to your insurance provider with any questions.    If during the course of the call the physician/provider feels a telephone visit is not appropriate, you will not be charged for this service.\"    Patient has given verbal consent for Telephone visit?  Yes    What phone number would you like to be contacted at? [phone]    How would you like to obtain your AVS? Mail a copy     Consuelo Cano CMA            Consult Notes on Referred Patient            RE: Jojo Fuentes  : 1955  EVIN: 2020       HPI:  Jojo Fuentes is 65 year old with recurrent ovarian cancer, here for second opinion regarding best next treatment option.  A [employer]  was utilized for the entire visit.  She reports at the current time she is feeling generally well and denies significant symptoms.  She denies any abdominal/pelvic pain, changes in her bowel/bladder habits, CP/SOB.  She notes she has been out of her niraparib and thus has not been taking it for the past month.    Cancer Course:    16:  CT A/P:  1.  There are no renal stones or hydronephrosis within either kidney. 2.  Both adnexa appear mildly " enlarged with a small amount of free fluid within the cul-de-sac. Although this is nonspecific, given the enlargement, pelvic ultrasound may be helpful for further evaluation.     4/27/16:  Consult with Dr Tapia, surgery recommended, pt declined    5/18/16:  CT A/P:  1. Known bilateral ovarian masses compatible with patient's known ovarian cancer. 2. Low attenuation lesion right upper quadrant, right lobe of the liver  and spleen concerning for metastatic disease, confirmation with PET CT could be performed as clinically indicated. 3. Small amount of pelvic ascites.    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  Thickened endometrium, presumably from the stated endometrial carcinoma.  No convincing metastatic disease. 2.  Mildly complex splenic cyst.      11/11/16:  Consult with Dr Pitts who recommended no completion surgery and to enter surveillance    1/24/17:  CT A/P:  1.  No renal or ureteral stone. No hydronephrosis or hydroureter. 2.  Normal appendix. 3.  Small lymph nodes in the mesentery, nonspecific. 4.  Osteopenia and degenerative changes of the spine and hips.    3/31/18:  CT A/P:  No evidence of hydronephrosis or nephrolithiasis. Changes around the liver and related to the spleen most concerning for metastatic disease given the patient's history.  = 29.4    4/13/18:  CT Chest: No evidence for metastatic disease to the chest.  Lungs are essentially clear.  Heterogeneous masses in the spleen and  low-density along the surface of the liver, as above, incompletely imaged on this study but similar appearance to previous imaging, likely corresponding with metastatic disease.    5/14/18:  CT C/A/P:  Metastatic disease within the spleen, increased from previous study. A tracheal lesion seen anteriorly along the liver edge which may represent mesenteric implant or possibly a subcapsular collection.  This does appears mildly increased in size from previous study. Bowel loops are unremarkable.  No suspicious adenopathy noted at this time.  = 45.2    5/31/18:  CT biopsy of spleen   Pathology:  recurrent high grade serous ovarian carcinoma    6/26/18-10/3/18:  Comnpleted 5 cycles of carboplatin and paclitaxel q 3 weeks, cycle 5 single agent carboplatin, stopped due to myelosuppression and pt intolerance,  = 69.5-->19.8    9/6/18:  CT C/A/P:  1. Interval decrease in size of the splenic lesions and peritoneal implant along the anterior hepatic region. 2. No other evidence of metastatic disease.    10/24/18:  PET/CT:  1. Single focus of abnormal FDG uptake noted in the right mastoid air cells without clear CT correlate, although this is only visualized on the cranial most slice.  Dedicated CT could be considered for further evaluation. 2. No abnormal FDG uptake in the chest, abdomen, or pelvis. 3. Grossly unchanged appearance of splenic lesions with no FDG uptake. Previously seen peritoneal nodule along the anterior surface of the liver is not clearly visualized.    12/13/18:  Consult at [facility] with Dr Lopez, Recommended debulking, but pt declined    12/14/18:  CT C/A/P:  1. Sequential decrease size of presumed splenic metastases since 05/14/2018, with resolution of perihepatic metastasis since that examination. 2. Mildly abnormal appearance lower uterus/cervix, suggest further correlation with pelvic ultrasound. 3. No evidence of thoracic metastatic disease.    12/19/18: Discussion of niraparib but pt declined  in favor of surveillance    2/19/19:  PET/CT:  1. Focus of asymmetric radiotracer uptake along the anterior liver adjacent to the gallbladder within segment 4B.  There is no discrete CT abnormality in the liver in this region.  It is uncertain if this is artifact, true abnormal radiotracer uptake in the liver to suggest early metastatic disease, or  misregistration from an adjacent peritoneal focus of activity.  This is seen within the vicinity of a remotely seen soft tissue perihepatic nodule on a CT from 05/14/2018.  Consider follow-up ultrasound for further evaluation. Attention on subsequent examination is recommended. 2. Mild asymmetric radiotracer uptake along the posteromedial right 10th and 11th ribs adjacent subpleural fat without focal destructive osseous lesion, pleural thickening, or soft tissue nodularity.  This is favored to be artifact and associated brown fat activity.    4/30/19:  PET/CT:  1. Two foci of peritoneal metastatic disease, one adjacent to the left hepatic lobe of the liver, the other adjacent to the spleen. 2. No other evidence of malignancy within the neck chest abdomen or pelvis. Additional nonemergent findings as above.    6/18/19:  PET/CT:  1. Slight interval increase in size of 2 metabolically active peritoneal metastases adjacent to the left hepatic lobe and spleen with stable metabolic activity near the spleen and slightly decreased metabolic activity near the liver. 2. No findings to suggest other metastatic disease within the neck, chest, abdomen or pelvis.    7/18/19:  CT C/A/P:  1. No CT evidence of pulmonary embolism. 2. Progression in metastatic disease with interval increase in size of splenic and perisplenic lesions and bilateral anterior peritoneal implants.    8/29/19:   = 38.6    9/6/19:  PET/CT:  1. Enlargement of cystic masses anterior to the liver as well as adjacent to the spleen and within the spleen itself as described above.  While qualitative assessment  demonstrates overall decreased tracer uptake from prior, given the patient's known serous neoplasm, interval enlargement would be highly concerning for disease progression.  No new hypermetabolic foci noted however.    12/2/19:  CT A/P:  1. No acute changes identified in the abdomen or pelvis. 2. Interval progression of the splenic metastatic disease as well as the perisplenic peritoneal implant.  These may be the causes of the patient left flank pain.  The peritoneal implant along the anterior aspect of the liver have not appreciably changed.    12/23/19:  PET/CT:  1. Decreasing size extrahepatic mass, however with increased FDG uptake.  The appearance is consistent with metastatic disease. 2. Low-density lesions are seen within and adjacent to the spleen, increasing in size.  Although these do not demonstrate increased FDG uptake, metastatic  disease suspected. 3. No other abnormal uptake is identified.    10/30/19- 3/9/20:  6 cycles of carboplatin (AUC3) and gemcitabine (75% dose reduction),  = 72.7--> 14.2    3/26/20:  PET/CT:  1. Significantly decreased FDG uptake in the right upper quadrant adjacent to the liver, low-attenuation mass previously noted is no longer visualized. Findings consistent with response to treatment. 2. Decreased size of low-attenuation splenic lesion without associated FDG uptake. 3. No evidence of malignancy in the chest.    4/13/20:  Began niraparib, however discontinued due to COVID concerns per pt    6/24/20:  PET/CT:  1. Progression of metastatic disease in abdomen.  Increased thickness and FDG uptake in the right anterior abdominal peritoneal deposit. 2. Indeterminate hypermetabolic lesion along the anterior splenic pole may represent a new metastatic deposit.      Review of Systems:  Systemic           no weight changes; no fever; no chills; no night sweats; no appetite changes  Skin           no rashes, or lesions  Eye           no irritation; no changes in  vision  Al-Laryngeal           no dysphagia; no hoarseness   Pulmonary    no cough; no shortness of breath  Cardiovascular    no chest pain; no palpitations  Gastrointestinal    no diarrhea; no constipation; no abdominal pain; no changes in bowel  habits; no blood in stool  Genitourinary   no urinary frequency; no urinary urgency; no dysuria; no pain; no abnormal vaginal discharge; no abnormal vaginal bleeding  Breast    no breast discharge; no breast changes; no breast pain  Musculoskeletal    no myalgias; no arthralgias; no back pain  Psychiatric           no depressed mood; no anxiety    Hematologic            no tender lymph nodes; no noticeable swellings or lumps   Endocrine    no hot flashes; no heat/cold intolerance         Neurological   no tremor; no numbness and tingling; no headaches; no difficulty sleeping    Obstetrics and Gynecology History:  ,  x 6   No HRT use      Past Medical History:  Past Medical History:   Diagnosis Date     Chronic pain      Monoallelic mutation of BRIP1 gene 2017    tested at [facility] 2017 BRIP1 c.1936+1G>A     Ovarian cancer (H)        Past Surgical History:  Past Surgical History:   Procedure Laterality Date     HYSTERECTOMY TOTAL ABDOMINAL, BILATERAL SALPINGO-OOPHORECTOMY, NODE DISSECTION, COMBINED Bilateral 2016    Procedure: COMBINED HYSTERECTOMY TOTAL ABDOMINAL, SALPINGO-OOPHORECTOMY, NODE DISSECTION;  Surgeon: Geno Tapia MD;  Location:  OR       Health Maintenance:  Last Pap Smear: 16              Result: Negative, HPV negative  She has not had a history of abnormal Pap smears.    Last Mammogram: 17              Result: normal      She has not had a history of abnormal mammograms.    Last Colonoscopy: Never      Current Medications:   has a current medication list which includes the following prescription(s): acetaminophen, calcium carbonate 600 mg-vitamin d 400 units, cetirizine, fluticasone, gabapentin, ibuprofen,  lidocaine-prilocaine, niraparib, ondansetron, and cholecalciferol.     Allergies:   Ascorbate and No clinical screening - see comments      Social History:  Social History     Socioeconomic History     Marital status:      Spouse name: Not on file     Number of children: Not on file     Years of education: Not on file     Highest education level: Not on file   Occupational History     Not on file   Social Needs     Financial resource strain: Not on file     Food insecurity     Worry: Not on file     Inability: Not on file     Transportation needs     Medical: Not on file     Non-medical: Not on file   Tobacco Use     Smoking status: Never Smoker     Smokeless tobacco: Never Used   Substance and Sexual Activity     Alcohol use: No     Alcohol/week: 0.0 standard drinks     Drug use: No     Sexual activity: Not on file   Lifestyle     Physical activity     Days per week: Not on file     Minutes per session: Not on file     Stress: Not on file   Relationships     Social connections     Talks on phone: Not on file     Gets together: Not on file     Attends Jain service: Not on file     Active member of club or organization: Not on file     Attends meetings of clubs or organizations: Not on file     Relationship status: Not on file     Intimate partner violence     Fear of current or ex partner: Not on file     Emotionally abused: Not on file     Physically abused: Not on file     Forced sexual activity: Not on file   Other Topics Concern     Not on file   Social History Narrative     Not on file       Lives with daughter, feels safe at home.  Does not work.  Does not have an advanced directive on file and would like her daughterPauly to be her POA.  Would like full resuscitation if reversible cause is identified, however would not like to be kept on life sustaining measures long-term.     Family History:   The patient's family history is significant for.  History reviewed. No pertinent family  history.        Assessment:    Jojo Fuentes is a 65 year old woman with a diagnosis of recurrent ovarian cancer.     A total of 60 minutes was spent with the patient, >50% of which were spent in counseling the patient and/or treatment planning.      Plan:     1.)    Recurrent ovarian cancer. Reviewed patients treatment course to date.  She reports she is overall feeling very well right now without significant side effects.  We reviewed her most recent CT showing increasing disease burden, however her  remains normal at this time.  We discussed that surgical debulking in the setting of platinum resistant disease is usually under taken for palliative measures only and given she is asymptomatic at this time, I would not recommend surgical debulking.  We discussed the option of enrolling on a clinical trial of which we have several options she may be eligible for, however she is not interested in pursuing this as she would not want to come to the [facility] for treatments at this time.  We then discussed options of further traditional chemotherapy vs niraparib therapy.  Given she only took niraparib for 1 month prior to stopping, I do not feel she has given this an adequate length to assess its efficacy.  She is inclined towards continuing niraparib therapy and I think for this reason, this is very reasonable.  She would like to continue to have this managed with Dr Lieberman and I will discuss this with Dr Lieberman.  We would be happy to see her back in the future if she were to ever become interested in pursuing a clinical trial.     2.) Genetic risk factors were assessed and the patient is POSITIVE for a BRIP1 likely pathogenic mutation. Likely pathogenic mutations are clinically treated as pathogenic. Specifically her mutation is called c.1936+1G>A as well as a variant of uncertain significance (VUS) in the MLH1 gene.  This variant is called c.1117G>A (p.G373R).    3.) Labs and/or tests ordered include:   None.     4.) Health maintenance issues addressed today include pt no longer needs screening exams given her platinum sensitive disease.        Thank you for allowing us to participate in the care of your patient.         Sincerely,    Ariadna Carpio MD  Gynecologic Oncology  [facility]       CC

## 2020-06-29 NOTE — LETTER
"    2020         RE: Jojo Fuentes  [address]  [address]  [place] [address]        Dear Colleague,    Thank you for referring your patient, Jojo Fuentes, to the [facility]. Please see a copy of my visit note below.    Jojo Fuentes is a 65 year old female who is being evaluated via a billable telephone visit.      The patient has been notified of following:     \"This telephone visit will be conducted via a call between you and your physician/provider. We have found that certain health care needs can be provided without the need for a physical exam.  This service lets us provide the care you need with a short phone conversation.  If a prescription is necessary we can send it directly to your pharmacy.  If lab work is needed we can place an order for that and you can then stop by our lab to have the test done at a later time.    Telephone visits are billed at different rates depending on your insurance coverage. During this emergency period, for some insurers they may be billed the same as an in-person visit.  Please reach out to your insurance provider with any questions.    If during the course of the call the physician/provider feels a telephone visit is not appropriate, you will not be charged for this service.\"    Patient has given verbal consent for Telephone visit?  Yes    What phone number would you like to be contacted at? [phone]    How would you like to obtain your AVS? Mail a copy     Consuelo Cano CMA            Consult Notes on Referred Patient            RE: Jojo Fuentes  : 1955  EVIN: 2020       HPI:  Jojo Fuentes is 65 year old with recurrent ovarian cancer, here for second opinion regarding best next treatment option.  A [employer]  was utilized for the entire visit.  She reports at the current time she is feeling generally well and denies significant symptoms.  She denies any abdominal/pelvic pain, changes in her bowel/bladder " habits, CP/SOB.  She notes she has been out of her niraparib and thus has not been taking it for the past month.    Cancer Course:    1/17/16:  CT A/P:  1.  There are no renal stones or hydronephrosis within either kidney. 2.  Both adnexa appear mildly enlarged with a small amount of free fluid within the cul-de-sac. Although this is nonspecific, given the enlargement, pelvic ultrasound may be helpful for further evaluation.     4/27/16:  Consult with Dr Tapia, surgery recommended, pt declined    5/18/16:  CT A/P:  1. Known bilateral ovarian masses compatible with patient's known ovarian cancer. 2. Low attenuation lesion right upper quadrant, right lobe of the liver  and spleen concerning for metastatic disease, confirmation with PET CT could be performed as clinically indicated. 3. Small amount of pelvic ascites.    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  Thickened endometrium, presumably from the stated endometrial carcinoma.  No convincing metastatic disease. 2.  Mildly complex splenic cyst.      11/11/16:  Consult with Dr Pitts who recommended no completion surgery and to enter surveillance    1/24/17:  CT A/P:  1.  No renal or ureteral stone. No hydronephrosis or hydroureter. 2.  Normal appendix. 3.  Small lymph nodes in the mesentery, nonspecific. 4.  Osteopenia and degenerative changes of the spine and hips.    3/31/18:  CT A/P:  No evidence of hydronephrosis or nephrolithiasis. Changes around the liver and  related to the spleen most concerning for metastatic disease given the patient's history.  = 29.4    4/13/18:  CT Chest: No evidence for metastatic disease to the chest.  Lungs are essentially clear.  Heterogeneous masses in the spleen and low-density along the surface of the liver, as above, incompletely imaged on this study but similar appearance to previous imaging, likely corresponding with metastatic disease.    5/14/18:  CT C/A/P:  Metastatic disease within the spleen, increased from previous study. A tracheal lesion seen anteriorly along the liver edge which may represent mesenteric implant or possibly a subcapsular collection.  This does appears mildly increased in size from previous study. Bowel loops are unremarkable.  No suspicious adenopathy noted at this time.  = 45.2    5/31/18:  CT biopsy of spleen   Pathology:  recurrent high grade serous ovarian carcinoma    6/26/18-10/3/18:  Comnpleted 5 cycles of carboplatin and paclitaxel q 3 weeks, cycle 5 single agent carboplatin, stopped due to myelosuppression and pt intolerance,  = 69.5-->19.8    9/6/18:  CT C/A/P:  1. Interval decrease in size of the splenic lesions and peritoneal implant along the anterior hepatic region. 2. No other evidence of metastatic disease.    10/24/18:  PET/CT:  1. Single focus of abnormal FDG uptake noted in the right mastoid air cells without clear CT correlate, although this is only visualized on the cranial most slice.  Dedicated CT could be considered for further evaluation. 2. No abnormal FDG uptake in the chest, abdomen, or pelvis. 3. Grossly unchanged appearance of splenic lesions with no FDG uptake. Previously seen peritoneal nodule along the anterior surface of the liver is not clearly visualized.    12/13/18:  Consult at [facility] with Dr Lopez, Recommended debulking, but pt declined    12/14/18:  CT C/A/P:  1. Sequential decrease size of presumed splenic metastases since 05/14/2018, with resolution of  perihepatic metastasis since that examination. 2. Mildly abnormal appearance lower uterus/cervix, suggest further correlation with pelvic ultrasound. 3. No evidence of thoracic metastatic disease.    12/19/18: Discussion of niraparib but pt declined in favor of surveillance    2/19/19:  PET/CT:  1. Focus of asymmetric radiotracer uptake along the anterior liver adjacent to the gallbladder within segment 4B.  There is no discrete CT abnormality in the liver in this region.  It is uncertain if this is artifact, true abnormal radiotracer uptake in the liver to suggest early metastatic disease, or  misregistration from an adjacent peritoneal focus of activity.  This is seen within the vicinity of a remotely seen soft tissue perihepatic nodule on a CT from 05/14/2018.  Consider follow-up ultrasound for further evaluation. Attention on subsequent examination is recommended. 2. Mild asymmetric radiotracer uptake along the posteromedial right 10th and 11th ribs adjacent subpleural fat without focal destructive osseous lesion, pleural thickening, or soft tissue nodularity.  This is favored to be artifact and associated brown fat activity.    4/30/19:  PET/CT:  1. Two foci of peritoneal metastatic disease, one adjacent to the left hepatic lobe of the liver, the other adjacent to the spleen. 2. No other evidence of malignancy within the neck chest abdomen or pelvis. Additional nonemergent findings as above.    6/18/19:  PET/CT:  1. Slight interval increase in size of 2 metabolically active peritoneal metastases adjacent to the left hepatic lobe and spleen with stable metabolic activity near the spleen and slightly decreased metabolic activity near the liver. 2. No findings to suggest other metastatic disease within the neck, chest, abdomen or pelvis.    7/18/19:  CT C/A/P:  1. No CT evidence of pulmonary embolism. 2. Progression in metastatic disease with interval increase in size of splenic and perisplenic lesions and  bilateral anterior peritoneal implants.    8/29/19:   = 38.6    9/6/19:  PET/CT:  1. Enlargement of cystic masses anterior to the liver as well as adjacent to the spleen and within the spleen itself as described above.  While qualitative assessment demonstrates overall decreased tracer uptake from prior, given the patient's known serous neoplasm, interval enlargement would be highly concerning for disease progression.  No new hypermetabolic foci noted however.    12/2/19:  CT A/P:  1. No acute changes identified in the abdomen or pelvis. 2. Interval progression of the splenic metastatic disease as well as the perisplenic peritoneal implant.  These may be the causes of the patient left flank pain.  The peritoneal implant along the anterior aspect of the liver have not appreciably changed.    12/23/19:  PET/CT:  1. Decreasing size extrahepatic mass, however with increased FDG uptake.  The appearance is consistent with metastatic disease. 2. Low-density lesions are seen within and adjacent to the spleen, increasing in size.  Although these do not demonstrate increased FDG uptake, metastatic  disease suspected. 3. No other abnormal uptake is identified.    10/30/19- 3/9/20:  6 cycles of carboplatin (AUC3) and gemcitabine (75% dose reduction),  = 72.7--> 14.2    3/26/20:  PET/CT:  1. Significantly decreased FDG uptake in the right upper quadrant adjacent to the liver, low-attenuation mass previously noted is no longer visualized. Findings consistent with response to treatment. 2. Decreased size of low-attenuation splenic lesion without associated FDG uptake. 3. No evidence of malignancy in the chest.    4/13/20:  Began niraparib, however discontinued due to COVID concerns per pt    6/24/20:  PET/CT:  1. Progression of metastatic disease in abdomen.  Increased thickness and FDG uptake in the right anterior abdominal peritoneal deposit. 2. Indeterminate hypermetabolic lesion along the anterior splenic pole may  represent a new metastatic deposit.      Review of Systems:  Systemic           no weight changes; no fever; no chills; no night sweats; no appetite changes  Skin           no rashes, or lesions  Eye           no irritation; no changes in vision  Al-Laryngeal           no dysphagia; no hoarseness   Pulmonary    no cough; no shortness of breath  Cardiovascular    no chest pain; no palpitations  Gastrointestinal    no diarrhea; no constipation; no abdominal pain; no changes in bowel  habits; no blood in stool  Genitourinary   no urinary frequency; no urinary urgency; no dysuria; no pain; no abnormal vaginal discharge; no abnormal vaginal bleeding  Breast    no breast discharge; no breast changes; no breast pain  Musculoskeletal    no myalgias; no arthralgias; no back pain  Psychiatric           no depressed mood; no anxiety    Hematologic            no tender lymph nodes; no noticeable swellings or lumps   Endocrine    no hot flashes; no heat/cold intolerance         Neurological   no tremor; no numbness and tingling; no headaches; no difficulty sleeping    Obstetrics and Gynecology History:  ,  x 6   No HRT use      Past Medical History:  Past Medical History:   Diagnosis Date     Chronic pain      Monoallelic mutation of BRIP1 gene 2017    tested at [facility] 2017 BRIP1 c.1936+1G>A     Ovarian cancer (H)        Past Surgical History:  Past Surgical History:   Procedure Laterality Date     HYSTERECTOMY TOTAL ABDOMINAL, BILATERAL SALPINGO-OOPHORECTOMY, NODE DISSECTION, COMBINED Bilateral 2016    Procedure: COMBINED HYSTERECTOMY TOTAL ABDOMINAL, SALPINGO-OOPHORECTOMY, NODE DISSECTION;  Surgeon: Geno Tapia MD;  Location:  OR       Health Maintenance:  Last Pap Smear: 16              Result: Negative, HPV negative  She has not had a history of abnormal Pap smears.    Last Mammogram: 17              Result: normal      She has not had a history of abnormal  mammograms.    Last Colonoscopy: Never      Current Medications:   has a current medication list which includes the following prescription(s): acetaminophen, calcium carbonate 600 mg-vitamin d 400 units, cetirizine, fluticasone, gabapentin, ibuprofen, lidocaine-prilocaine, niraparib, ondansetron, and cholecalciferol.     Allergies:   Ascorbate and No clinical screening - see comments      Social History:  Social History     Socioeconomic History     Marital status:      Spouse name: Not on file     Number of children: Not on file     Years of education: Not on file     Highest education level: Not on file   Occupational History     Not on file   Social Needs     Financial resource strain: Not on file     Food insecurity     Worry: Not on file     Inability: Not on file     Transportation needs     Medical: Not on file     Non-medical: Not on file   Tobacco Use     Smoking status: Never Smoker     Smokeless tobacco: Never Used   Substance and Sexual Activity     Alcohol use: No     Alcohol/week: 0.0 standard drinks     Drug use: No     Sexual activity: Not on file   Lifestyle     Physical activity     Days per week: Not on file     Minutes per session: Not on file     Stress: Not on file   Relationships     Social connections     Talks on phone: Not on file     Gets together: Not on file     Attends Faith service: Not on file     Active member of club or organization: Not on file     Attends meetings of clubs or organizations: Not on file     Relationship status: Not on file     Intimate partner violence     Fear of current or ex partner: Not on file     Emotionally abused: Not on file     Physically abused: Not on file     Forced sexual activity: Not on file   Other Topics Concern     Not on file   Social History Narrative     Not on file       Lives with daughter, feels safe at home.  Does not work.  Does not have an advanced directive on file and would like her daughter, Pauly to be her POA.  Would  like full resuscitation if reversible cause is identified, however would not like to be kept on life sustaining measures long-term.     Family History:   The patient's family history is significant for.  History reviewed. No pertinent family history.        Assessment:    Jojo Fuentes is a 65 year old woman with a diagnosis of recurrent ovarian cancer.     A total of 60 minutes was spent with the patient, >50% of which were spent in counseling the patient and/or treatment planning.      Plan:     1.)    Recurrent ovarian cancer. Reviewed patients treatment course to date.  She reports she is overall feeling very well right now without significant side effects.  We reviewed her most recent CT showing increasing disease burden, however her  remains normal at this time.  We discussed that surgical debulking in the setting of platinum resistant disease is usually under taken for palliative measures only and given she is asymptomatic at this time, I would not recommend surgical debulking.  We discussed the option of enrolling on a clinical trial of which we have several options she may be eligible for, however she is not interested in pursuing this as she would not want to come to the [facility] for treatments at this time.  We then discussed options of further traditional chemotherapy vs niraparib therapy.  Given she only took niraparib for 1 month prior to stopping, I do not feel she has given this an adequate length to assess its efficacy.  She is inclined towards continuing niraparib therapy and I think for this reason, this is very reasonable.  She would like to continue to have this managed with Dr Lieberman and I will discuss this with Dr Lieberman.  We would be happy to see her back in the future if she were to ever become interested in pursuing a clinical trial.     2.) Genetic risk factors were assessed and the patient is POSITIVE for a BRIP1 likely pathogenic mutation. Likely pathogenic mutations are  clinically treated as pathogenic. Specifically her mutation is called c.1936+1G>A as well as a variant of uncertain significance (VUS) in the MLH1 gene.  This variant is called c.1117G>A (p.G373R).    3.) Labs and/or tests ordered include:  None.     4.) Health maintenance issues addressed today include pt no longer needs screening exams given her platinum sensitive disease.        Thank you for allowing us to participate in the care of your patient.         Sincerely,    Ariadna Carpio MD  Gynecologic Oncology  [facility]       CC           Again, thank you for allowing me to participate in the care of your patient.        Sincerely,        Rodney Carpio MD

## 2024-06-01 NOTE — LETTER
2017       RE: Jojo Fuentes  [address]    [place] [address]     Dear Colleague,    Thank you for referring your patient, Jojo Fuentes, to the [facility]. Please see a copy of my visit note below.    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 16: XL//BSO/OMX/suboptimal tumor debulking by Dr Lopez (operative notes conflicting regarding extent of residual disease).   2.  Received 6 cycles of adjuvant dose dense chemotherapy in [facility] with Dr Licea (dose dense x 3 cycles, then q 3 week for cycles 4-6)  Per review of the notes, has had some issues with pancytopenia, but no major treatment delays     Ms Jojo Fuentes is a 61 year old year old [place] female who presents for followup regarding advanced ovarian cancer. She is here today with a [facility] .        Previous visit:  Today, she feels very well. Still with some neuropathy of feet.  No bleeding.   Describes that her whole body feels swollen. She would really like her port out.         Review of Systems:  Systemic:No weight changes.    Skin : No skin changes or new lesions.   Eye : No changes in vision.   Pulmonary: No cough or SOB.   Cardiovascular: No CP or palpitations  Gastrointestinal : No diarrhea, constipation, abdominal pain. Bowel habits normal. Nausea  Genitourinary: No dysuria, urgency or bleeding  Psychiatric: No depression or anxiety  Hematologic : No palpable lymph nodes.   Endocrine : No hot flashes. No heat/cold intolerance.      Neurological: No headaches, some numbness.         Past Medical History:  Chronic pain  Ovarian cancer      Past Surgical History:  KATALINA, BSO, LND       OBGYN history and  The physician was paged. "Health Maintenance:    Last Mamogram: normal  Last Colonoscopy: 2016 normal      Current Medications:   acetaminophen (TYLENOL) 325 MG tablet  Take 2 tablets (650 mg) by mouth every 4 hours as needed for mild pain or fever     calcium-vitamin D (CALTRATE) 600-400 MG-UNIT per tablet  Take 1 tablet by mouth 2 times daily     enoxaparin (LOVENOX) 40 MG/0.4ML syringe  Inject 0.4 mLs (40 mg) Subcutaneous every 24 hours     GABAPENTIN PO  Take 100 mg by mouth daily Reported on 2017     ibuprofen (ADVIL/MOTRIN) 600 MG tablet  Take 1 tablet (600 mg) by mouth every 6 hours as needed for other (inflammatory pain)     Omeprazole (PRILOSEC PO)  Take 40 mg by mouth every morning Reported on 2017     oxyCODONE (ROXICODONE) 5 MG immediate release tablet  Take 1-2 tablets (5-10 mg) by mouth every 4 hours as needed for moderate to severe pain     polyethylene glycol (MIRALAX/GLYCOLAX) Packet  Take 17 g by mouth daily     senna-docusate (SENOKOT-S;PERICOLACE) 8.6-50 MG per tablet  Take 1-2 tablets by mouth 2 times daily     simethicone (MYLICON) 80 MG chewable tablet  Take 1 tablet (80 mg) by mouth 4 times daily as needed for cramping (gas)     VITAMIN D, CHOLECALCIFEROL, PO  Take 1,000 Units by mouth daily Reported on 2017               Allergies: Ascorbate (swelling)         Social History:  Never smoker. Denies alcohol use.  Work: homemaker  Ethnicity identification: [place]tarian  Preferred language: Gibraltarian  Lives at home with: Daughters     Family History:   The patient's family history is notable for no known gyn, colon, breast malignanices although limited knowledge of disease in previous generations     Physical Exam:   /79  Pulse 70  Temp 98.6  F (37  C) (Oral)  Resp 17  Ht 1.651 m (5' 5\")  Wt 94.3 kg (207 lb 14.3 oz)  SpO2 99%  Breastfeeding? No  BMI 34.6 kg/m2      General: Alert and oriented, no acute distress. Quiet today.   Psych: Mood stable  Cardiovascular: RRR, no murmors  Pulmonary: " "Lungs clear . Normal respiratory effort  GI: Obesity. No distention. No masses. No hernia. Incision is well healed  Lymph: No enlarge lymph nodes in neck or groin  : Evidence of previous genital surgery with absent labia minora and minimal clitoris. Cervix multiparous and present. Cervix smooth except.  Nabothain cysts on cervix.   Chest:IV port c/d/i.      Ca125:   6/2016: 143  8/12/16: 25.3  9/16: 14  11/11/2016: 10  2/27/2017 (in [facility]): 12  4/14/17: 8  7/20/17: pending           CT C/A/P 11/3/2016: Impression: Thickened endometrium, presumably from the stated endometrial carcinoma. No convincng metastatic disease. Mildly complex splenic cyst.      Assessment:     Jojo Fuentes is a 61 year old woman with Advanced ovarian/fallopian adenocarcinoma, stage IIIC suboptimally debulked, completed adjuvant carbo/taxol.          Plan:      1.)   Ovarian/fallopian tube cancer: JOAN based on exam. Ca-125 pending today.  As previously discussed, I do not think a \"second look\" surgery is indicated in her. I would recommend ongoing surveillance with Ca-125 and exams every 3 months. No CT scans unless ca-125 is elevated.      -has followup in [facility] 12.2017 as scheduled. We will see her 3/2018.   -Port out in [facility].   -Follow with exams and ca-125     2.)                     Genetic risk factors were assessed:  Met with genetics last week, will follow up results     3.)                     Labs and/or tests ordered include: Ca-125         Ellen Pitts MD  Gynecologic Oncology  Pager [phone]         "